# Patient Record
Sex: MALE | Race: WHITE | NOT HISPANIC OR LATINO | Employment: OTHER | URBAN - NONMETROPOLITAN AREA
[De-identification: names, ages, dates, MRNs, and addresses within clinical notes are randomized per-mention and may not be internally consistent; named-entity substitution may affect disease eponyms.]

---

## 2017-02-06 ENCOUNTER — APPOINTMENT (OUTPATIENT)
Dept: GENERAL RADIOLOGY | Facility: HOSPITAL | Age: 37
End: 2017-02-06

## 2017-02-06 ENCOUNTER — APPOINTMENT (OUTPATIENT)
Dept: CT IMAGING | Facility: HOSPITAL | Age: 37
End: 2017-02-06

## 2017-02-06 ENCOUNTER — HOSPITAL ENCOUNTER (EMERGENCY)
Facility: HOSPITAL | Age: 37
Discharge: HOME | End: 2017-02-06
Admitting: FAMILY MEDICINE

## 2017-02-06 VITALS
WEIGHT: 213 LBS | HEART RATE: 77 BPM | SYSTOLIC BLOOD PRESSURE: 134 MMHG | BODY MASS INDEX: 31.55 KG/M2 | TEMPERATURE: 98 F | HEIGHT: 69 IN | RESPIRATION RATE: 18 BRPM | OXYGEN SATURATION: 97 % | DIASTOLIC BLOOD PRESSURE: 86 MMHG

## 2017-02-06 DIAGNOSIS — S16.1XXA CERVICAL STRAIN, INITIAL ENCOUNTER: ICD-10-CM
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
## 2017-02-06 DIAGNOSIS — S70.02XA CONTUSION OF LEFT HIP, INITIAL ENCOUNTER: ICD-10-CM

## 2017-02-06 DIAGNOSIS — S09.90XA HEAD INJURY, INITIAL ENCOUNTER: ICD-10-CM

## 2017-02-06 PROCEDURE — 70450 CT HEAD/BRAIN W/O DYE: CPT

## 2017-02-06 PROCEDURE — 99284 EMERGENCY DEPT VISIT MOD MDM: CPT

## 2017-02-06 PROCEDURE — 72125 CT NECK SPINE W/O DYE: CPT

## 2017-02-06 PROCEDURE — 25010000002 ONDANSETRON PER 1 MG: Performed by: PHYSICIAN ASSISTANT

## 2017-02-06 PROCEDURE — 25010000002 HYDROMORPHONE PER 4 MG: Performed by: PHYSICIAN ASSISTANT

## 2017-02-06 PROCEDURE — 72110 X-RAY EXAM L-2 SPINE 4/>VWS: CPT

## 2017-02-06 PROCEDURE — 73502 X-RAY EXAM HIP UNI 2-3 VIEWS: CPT

## 2017-02-06 PROCEDURE — 72072 X-RAY EXAM THORAC SPINE 3VWS: CPT

## 2017-02-06 PROCEDURE — 96372 THER/PROPH/DIAG INJ SC/IM: CPT

## 2017-02-06 RX ORDER — CYCLOBENZAPRINE HCL 10 MG
10 TABLET ORAL 3 TIMES DAILY PRN
Qty: 15 TABLET | Refills: 0 | Status: SHIPPED | OUTPATIENT
Start: 2017-02-06 | End: 2018-05-18

## 2017-02-06 RX ORDER — HYDROCODONE BITARTRATE AND ACETAMINOPHEN 10; 325 MG/1; MG/1
1 TABLET ORAL ONCE
Status: DISCONTINUED | OUTPATIENT
Start: 2017-02-06 | End: 2017-02-06 | Stop reason: HOSPADM

## 2017-02-06 RX ORDER — HYDROCODONE BITARTRATE AND ACETAMINOPHEN 7.5; 325 MG/1; MG/1
1 TABLET ORAL EVERY 6 HOURS PRN
Qty: 10 TABLET | Refills: 0 | Status: SHIPPED | OUTPATIENT
Start: 2017-02-06 | End: 2017-05-05

## 2017-02-06 RX ORDER — ONDANSETRON 2 MG/ML
4 INJECTION INTRAMUSCULAR; INTRAVENOUS ONCE
Status: DISCONTINUED | OUTPATIENT
Start: 2017-02-06 | End: 2017-02-06

## 2017-02-06 RX ORDER — ONDANSETRON 2 MG/ML
4 INJECTION INTRAMUSCULAR; INTRAVENOUS ONCE
Status: COMPLETED | OUTPATIENT
Start: 2017-02-06 | End: 2017-02-06

## 2017-02-06 RX ADMIN — HYDROMORPHONE HYDROCHLORIDE 1 MG: 1 INJECTION, SOLUTION INTRAMUSCULAR; INTRAVENOUS; SUBCUTANEOUS at 16:22

## 2017-02-06 RX ADMIN — ONDANSETRON HYDROCHLORIDE 4 MG: 2 SOLUTION INTRAMUSCULAR; INTRAVENOUS at 16:22
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
## 2017-02-10 ENCOUNTER — TELEPHONE (OUTPATIENT)
Dept: NEUROLOGY | Facility: CLINIC | Age: 37
End: 2017-02-10

## 2017-04-03 RX ORDER — LEVETIRACETAM 500 MG/1
1500 TABLET, EXTENDED RELEASE ORAL 2 TIMES DAILY
Qty: 180 TABLET | Refills: 3 | Status: SHIPPED | OUTPATIENT
Start: 2017-04-03 | End: 2017-05-05 | Stop reason: SDUPTHER

## 2017-05-05 ENCOUNTER — OFFICE VISIT (OUTPATIENT)
Dept: NEUROLOGY | Facility: CLINIC | Age: 37
End: 2017-05-05

## 2017-05-05 VITALS
HEART RATE: 74 BPM | SYSTOLIC BLOOD PRESSURE: 110 MMHG | WEIGHT: 217 LBS | DIASTOLIC BLOOD PRESSURE: 82 MMHG | BODY MASS INDEX: 32.14 KG/M2 | HEIGHT: 69 IN

## 2017-05-05 DIAGNOSIS — G47.00 INSOMNIA, UNSPECIFIED TYPE: ICD-10-CM

## 2017-05-05 DIAGNOSIS — S06.9X9S TBI (TRAUMATIC BRAIN INJURY), WITH LOSS OF CONSCIOUSNESS OF UNSPECIFIED DURATION, SEQUELA: ICD-10-CM

## 2017-05-05 DIAGNOSIS — F06.30 MOOD DISORDER DUE TO OLD HEAD INJURY: ICD-10-CM

## 2017-05-05 DIAGNOSIS — S09.90XS MOOD DISORDER DUE TO OLD HEAD INJURY: ICD-10-CM

## 2017-05-05 DIAGNOSIS — G40.309 GENERALIZED SEIZURE DISORDER (HCC): Primary | ICD-10-CM

## 2017-05-05 PROCEDURE — 99213 OFFICE O/P EST LOW 20 MIN: CPT | Performed by: CLINICAL NURSE SPECIALIST

## 2017-05-05 RX ORDER — LEVETIRACETAM 500 MG/1
TABLET, EXTENDED RELEASE ORAL
Qty: 180 TABLET | Refills: 5 | Status: SHIPPED | OUTPATIENT
Start: 2017-05-05 | End: 2017-11-22 | Stop reason: SDUPTHER

## 2017-09-07 ENCOUNTER — HOSPITAL ENCOUNTER (INPATIENT)
Age: 37
LOS: 4 days | Discharge: HOME | DRG: 881 | End: 2017-09-11
Attending: EMERGENCY MEDICINE | Admitting: PSYCHIATRY & NEUROLOGY
Payer: MEDICAID

## 2017-09-07 ENCOUNTER — APPOINTMENT (OUTPATIENT)
Dept: CT IMAGING | Age: 37
DRG: 881 | End: 2017-09-07
Payer: MEDICAID

## 2017-09-07 DIAGNOSIS — R45.851 SUICIDAL IDEATION: Primary | ICD-10-CM

## 2017-09-07 LAB
ALBUMIN SERPL-MCNC: 5.1 G/DL (ref 3.5–5.2)
ALP BLD-CCNC: 61 U/L (ref 40–130)
ALT SERPL-CCNC: 48 U/L (ref 5–41)
AMPHETAMINE SCREEN, URINE: NEGATIVE
ANION GAP SERPL CALCULATED.3IONS-SCNC: 17 MMOL/L (ref 7–19)
AST SERPL-CCNC: 24 U/L (ref 5–40)
BARBITURATE SCREEN URINE: NEGATIVE
BASOPHILS ABSOLUTE: 0 K/UL (ref 0–0.2)
BASOPHILS RELATIVE PERCENT: 0.2 % (ref 0–1)
BENZODIAZEPINE SCREEN, URINE: POSITIVE
BILIRUB SERPL-MCNC: 0.9 MG/DL (ref 0.2–1.2)
BUN BLDV-MCNC: 9 MG/DL (ref 6–20)
CALCIUM SERPL-MCNC: 10 MG/DL (ref 8.6–10)
CANNABINOID SCREEN URINE: NEGATIVE
CHLORIDE BLD-SCNC: 103 MMOL/L (ref 98–111)
CO2: 23 MMOL/L (ref 22–29)
COCAINE METABOLITE SCREEN URINE: NEGATIVE
CREAT SERPL-MCNC: 0.7 MG/DL (ref 0.5–1.2)
EOSINOPHILS ABSOLUTE: 0 K/UL (ref 0–0.6)
EOSINOPHILS RELATIVE PERCENT: 0.4 % (ref 0–5)
ETHANOL: <10 MG/DL (ref 0–0.08)
GFR NON-AFRICAN AMERICAN: >60
GLUCOSE BLD-MCNC: 112 MG/DL (ref 74–109)
HCT VFR BLD CALC: 46 % (ref 42–52)
HEMOGLOBIN: 16.9 G/DL (ref 14–18)
LYMPHOCYTES ABSOLUTE: 1.4 K/UL (ref 1.1–4.5)
LYMPHOCYTES RELATIVE PERCENT: 17.5 % (ref 20–40)
Lab: ABNORMAL
MCH RBC QN AUTO: 30.1 PG (ref 27–31)
MCHC RBC AUTO-ENTMCNC: 36.7 G/DL (ref 33–37)
MCV RBC AUTO: 81.9 FL (ref 80–94)
MONOCYTES ABSOLUTE: 0.4 K/UL (ref 0–0.9)
MONOCYTES RELATIVE PERCENT: 4.4 % (ref 0–10)
NEUTROPHILS ABSOLUTE: 6.3 K/UL (ref 1.5–7.5)
NEUTROPHILS RELATIVE PERCENT: 77.1 % (ref 50–65)
OPIATE SCREEN URINE: NEGATIVE
PDW BLD-RTO: 12.5 % (ref 11.5–14.5)
PLATELET # BLD: 189 K/UL (ref 130–400)
PMV BLD AUTO: 11.6 FL (ref 9.4–12.4)
POTASSIUM SERPL-SCNC: 3.6 MMOL/L (ref 3.5–5)
RBC # BLD: 5.62 M/UL (ref 4.7–6.1)
SODIUM BLD-SCNC: 143 MMOL/L (ref 136–145)
TOTAL PROTEIN: 8.3 G/DL (ref 6.6–8.7)
WBC # BLD: 8.2 K/UL (ref 4.8–10.8)

## 2017-09-07 PROCEDURE — 70450 CT HEAD/BRAIN W/O DYE: CPT

## 2017-09-07 PROCEDURE — 99284 EMERGENCY DEPT VISIT MOD MDM: CPT | Performed by: EMERGENCY MEDICINE

## 2017-09-07 PROCEDURE — 36415 COLL VENOUS BLD VENIPUNCTURE: CPT

## 2017-09-07 PROCEDURE — 99285 EMERGENCY DEPT VISIT HI MDM: CPT

## 2017-09-07 PROCEDURE — G0480 DRUG TEST DEF 1-7 CLASSES: HCPCS

## 2017-09-07 PROCEDURE — 90791 PSYCH DIAGNOSTIC EVALUATION: CPT | Performed by: PSYCHIATRY & NEUROLOGY

## 2017-09-07 PROCEDURE — 80307 DRUG TEST PRSMV CHEM ANLYZR: CPT

## 2017-09-07 PROCEDURE — 85025 COMPLETE CBC W/AUTO DIFF WBC: CPT

## 2017-09-07 PROCEDURE — 80053 COMPREHEN METABOLIC PANEL: CPT

## 2017-09-07 PROCEDURE — 93005 ELECTROCARDIOGRAM TRACING: CPT

## 2017-09-07 PROCEDURE — 1240000000 HC EMOTIONAL WELLNESS R&B

## 2017-09-07 PROCEDURE — 6370000000 HC RX 637 (ALT 250 FOR IP): Performed by: PSYCHIATRY & NEUROLOGY

## 2017-09-07 RX ORDER — DIAZEPAM 5 MG/1
5 TABLET ORAL 2 TIMES DAILY PRN
Status: DISCONTINUED | OUTPATIENT
Start: 2017-09-07 | End: 2017-09-08

## 2017-09-07 RX ORDER — RISPERIDONE 1 MG/1
1 TABLET, FILM COATED ORAL 2 TIMES DAILY
Status: DISCONTINUED | OUTPATIENT
Start: 2017-09-07 | End: 2017-09-11 | Stop reason: HOSPADM

## 2017-09-07 RX ORDER — ACETAMINOPHEN 325 MG/1
650 TABLET ORAL EVERY 4 HOURS PRN
Status: DISCONTINUED | OUTPATIENT
Start: 2017-09-07 | End: 2017-09-11 | Stop reason: HOSPADM

## 2017-09-07 RX ORDER — LEVETIRACETAM 500 MG/1
1500 TABLET ORAL 2 TIMES DAILY
Status: DISCONTINUED | OUTPATIENT
Start: 2017-09-07 | End: 2017-09-08

## 2017-09-07 RX ORDER — DIAZEPAM 5 MG/1
5 TABLET ORAL DAILY
Status: ON HOLD | COMMUNITY
End: 2017-09-11 | Stop reason: HOSPADM

## 2017-09-07 RX ORDER — LEVETIRACETAM 500 MG/1
1500 TABLET ORAL 2 TIMES DAILY
Status: ON HOLD | COMMUNITY
End: 2017-09-08 | Stop reason: CLARIF

## 2017-09-07 RX ADMIN — DIAZEPAM 5 MG: 5 TABLET ORAL at 18:17

## 2017-09-07 RX ADMIN — RISPERIDONE 1 MG: 1 TABLET, FILM COATED ORAL at 20:52

## 2017-09-07 RX ADMIN — LEVETIRACETAM 1500 MG: 500 TABLET, FILM COATED ORAL at 20:52

## 2017-09-07 ASSESSMENT — SLEEP AND FATIGUE QUESTIONNAIRES
SLEEP PATTERN: DIFFICULTY FALLING ASLEEP;RESTLESSNESS
DIFFICULTY FALLING ASLEEP: YES
DIFFICULTY STAYING ASLEEP: YES
DIFFICULTY ARISING: YES
DO YOU USE A SLEEP AID: NO
DO YOU HAVE DIFFICULTY SLEEPING: YES
RESTFUL SLEEP: NO

## 2017-09-07 ASSESSMENT — PAIN SCALES - GENERAL: PAINLEVEL_OUTOF10: 9

## 2017-09-07 ASSESSMENT — PAIN DESCRIPTION - LOCATION: LOCATION: GENERALIZED

## 2017-09-07 ASSESSMENT — LIFESTYLE VARIABLES: HISTORY_ALCOHOL_USE: NO

## 2017-09-07 ASSESSMENT — ENCOUNTER SYMPTOMS: BACK PAIN: 1

## 2017-09-08 PROCEDURE — 1240000000 HC EMOTIONAL WELLNESS R&B

## 2017-09-08 PROCEDURE — 99231 SBSQ HOSP IP/OBS SF/LOW 25: CPT | Performed by: PSYCHIATRY & NEUROLOGY

## 2017-09-08 PROCEDURE — 6370000000 HC RX 637 (ALT 250 FOR IP): Performed by: PSYCHIATRY & NEUROLOGY

## 2017-09-08 RX ORDER — DIAZEPAM 5 MG/1
5 TABLET ORAL DAILY
Status: DISCONTINUED | OUTPATIENT
Start: 2017-09-09 | End: 2017-09-11 | Stop reason: HOSPADM

## 2017-09-08 RX ORDER — LEVETIRACETAM 500 MG/1
1500 TABLET, EXTENDED RELEASE ORAL 2 TIMES DAILY
Status: ON HOLD | COMMUNITY
End: 2017-09-11 | Stop reason: HOSPADM

## 2017-09-08 RX ORDER — LEVETIRACETAM 500 MG/1
1500 TABLET, EXTENDED RELEASE ORAL 2 TIMES DAILY
Status: DISCONTINUED | OUTPATIENT
Start: 2017-09-08 | End: 2017-09-11 | Stop reason: HOSPADM

## 2017-09-08 RX ADMIN — LEVETIRACETAM 1500 MG: 500 TABLET, FILM COATED ORAL at 08:24

## 2017-09-08 RX ADMIN — DIAZEPAM 5 MG: 5 TABLET ORAL at 08:27

## 2017-09-08 RX ADMIN — RISPERIDONE 1 MG: 1 TABLET, FILM COATED ORAL at 21:51

## 2017-09-08 RX ADMIN — LEVETIRACETAM 1500 MG: 500 TABLET, EXTENDED RELEASE ORAL at 21:51

## 2017-09-08 RX ADMIN — RISPERIDONE 1 MG: 1 TABLET, FILM COATED ORAL at 08:24

## 2017-09-09 LAB
HBA1C MFR BLD: 5 %
TSH SERPL DL<=0.05 MIU/L-ACNC: 0.86 UIU/ML (ref 0.27–4.2)
VITAMIN B-12: 736 PG/ML (ref 211–946)
VITAMIN D 25-HYDROXY: 29.5 NG/ML

## 2017-09-09 PROCEDURE — 83036 HEMOGLOBIN GLYCOSYLATED A1C: CPT

## 2017-09-09 PROCEDURE — 36415 COLL VENOUS BLD VENIPUNCTURE: CPT

## 2017-09-09 PROCEDURE — 6370000000 HC RX 637 (ALT 250 FOR IP): Performed by: PSYCHIATRY & NEUROLOGY

## 2017-09-09 PROCEDURE — 99231 SBSQ HOSP IP/OBS SF/LOW 25: CPT | Performed by: PSYCHIATRY & NEUROLOGY

## 2017-09-09 PROCEDURE — 82306 VITAMIN D 25 HYDROXY: CPT

## 2017-09-09 PROCEDURE — 84443 ASSAY THYROID STIM HORMONE: CPT

## 2017-09-09 PROCEDURE — 82607 VITAMIN B-12: CPT

## 2017-09-09 PROCEDURE — 1240000000 HC EMOTIONAL WELLNESS R&B

## 2017-09-09 PROCEDURE — 6370000000 HC RX 637 (ALT 250 FOR IP): Performed by: FAMILY MEDICINE

## 2017-09-09 RX ORDER — HYDROXYZINE HYDROCHLORIDE 25 MG/1
50 TABLET, FILM COATED ORAL 3 TIMES DAILY PRN
Status: DISCONTINUED | OUTPATIENT
Start: 2017-09-09 | End: 2017-09-11 | Stop reason: HOSPADM

## 2017-09-09 RX ORDER — ERGOCALCIFEROL (VITAMIN D2) 1250 MCG
50000 CAPSULE ORAL WEEKLY
Qty: 11 CAPSULE | Refills: 0 | Status: SHIPPED | OUTPATIENT
Start: 2017-09-09 | End: 2017-11-19

## 2017-09-09 RX ORDER — ERGOCALCIFEROL 1.25 MG/1
50000 CAPSULE ORAL WEEKLY
Status: DISCONTINUED | OUTPATIENT
Start: 2017-09-09 | End: 2017-09-11 | Stop reason: HOSPADM

## 2017-09-09 RX ORDER — DIAZEPAM 5 MG/1
5 TABLET ORAL ONCE
Status: COMPLETED | OUTPATIENT
Start: 2017-09-09 | End: 2017-09-09

## 2017-09-09 RX ADMIN — LEVETIRACETAM 1500 MG: 500 TABLET, EXTENDED RELEASE ORAL at 20:46

## 2017-09-09 RX ADMIN — ERGOCALCIFEROL 50000 UNITS: 1.25 CAPSULE ORAL at 13:10

## 2017-09-09 RX ADMIN — LEVETIRACETAM 1500 MG: 500 TABLET, EXTENDED RELEASE ORAL at 08:14

## 2017-09-09 RX ADMIN — RISPERIDONE 1 MG: 1 TABLET, FILM COATED ORAL at 08:15

## 2017-09-09 RX ADMIN — RISPERIDONE 1 MG: 1 TABLET, FILM COATED ORAL at 20:46

## 2017-09-09 RX ADMIN — HYDROXYZINE HYDROCHLORIDE 50 MG: 25 TABLET, FILM COATED ORAL at 16:24

## 2017-09-09 RX ADMIN — HYDROXYZINE HYDROCHLORIDE 50 MG: 25 TABLET, FILM COATED ORAL at 20:46

## 2017-09-09 RX ADMIN — DIAZEPAM 5 MG: 5 TABLET ORAL at 08:15

## 2017-09-09 RX ADMIN — DIAZEPAM 5 MG: 5 TABLET ORAL at 11:49

## 2017-09-10 PROCEDURE — 6370000000 HC RX 637 (ALT 250 FOR IP): Performed by: PSYCHIATRY & NEUROLOGY

## 2017-09-10 PROCEDURE — 1240000000 HC EMOTIONAL WELLNESS R&B

## 2017-09-10 RX ADMIN — DIAZEPAM 5 MG: 5 TABLET ORAL at 08:23

## 2017-09-10 RX ADMIN — HYDROXYZINE HYDROCHLORIDE 50 MG: 25 TABLET, FILM COATED ORAL at 17:24

## 2017-09-10 RX ADMIN — LEVETIRACETAM 1500 MG: 500 TABLET, EXTENDED RELEASE ORAL at 20:51

## 2017-09-10 RX ADMIN — ACETAMINOPHEN 650 MG: 325 TABLET, FILM COATED ORAL at 21:09

## 2017-09-10 RX ADMIN — LEVETIRACETAM 1500 MG: 500 TABLET, EXTENDED RELEASE ORAL at 10:09

## 2017-09-10 RX ADMIN — HYDROXYZINE HYDROCHLORIDE 50 MG: 25 TABLET, FILM COATED ORAL at 10:09

## 2017-09-10 RX ADMIN — HYDROXYZINE HYDROCHLORIDE 50 MG: 25 TABLET, FILM COATED ORAL at 20:51

## 2017-09-10 RX ADMIN — RISPERIDONE 1 MG: 1 TABLET, FILM COATED ORAL at 08:23

## 2017-09-10 RX ADMIN — RISPERIDONE 1 MG: 1 TABLET, FILM COATED ORAL at 20:51

## 2017-09-10 ASSESSMENT — PAIN SCALES - GENERAL
PAINLEVEL_OUTOF10: 4
PAINLEVEL_OUTOF10: 7

## 2017-09-10 ASSESSMENT — PAIN DESCRIPTION - LOCATION: LOCATION: BACK;HIP

## 2017-09-10 ASSESSMENT — PAIN DESCRIPTION - ORIENTATION: ORIENTATION: RIGHT;LEFT;LOWER

## 2017-09-10 ASSESSMENT — PAIN DESCRIPTION - PROGRESSION: CLINICAL_PROGRESSION: GRADUALLY IMPROVING

## 2017-09-10 ASSESSMENT — PAIN DESCRIPTION - ONSET: ONSET: ON-GOING

## 2017-09-10 ASSESSMENT — PAIN DESCRIPTION - DESCRIPTORS: DESCRIPTORS: ACHING;CONSTANT

## 2017-09-10 ASSESSMENT — PAIN DESCRIPTION - PAIN TYPE: TYPE: CHRONIC PAIN

## 2017-09-10 ASSESSMENT — PAIN DESCRIPTION - FREQUENCY: FREQUENCY: CONTINUOUS

## 2017-09-11 VITALS
DIASTOLIC BLOOD PRESSURE: 79 MMHG | TEMPERATURE: 97.9 F | SYSTOLIC BLOOD PRESSURE: 141 MMHG | WEIGHT: 210 LBS | OXYGEN SATURATION: 100 % | BODY MASS INDEX: 30.06 KG/M2 | HEART RATE: 119 BPM | HEIGHT: 70 IN | RESPIRATION RATE: 16 BRPM

## 2017-09-11 PROCEDURE — 99238 HOSP IP/OBS DSCHRG MGMT 30/<: CPT | Performed by: PSYCHIATRY & NEUROLOGY

## 2017-09-11 PROCEDURE — 6370000000 HC RX 637 (ALT 250 FOR IP): Performed by: PSYCHIATRY & NEUROLOGY

## 2017-09-11 PROCEDURE — 5130000000 HC BRIDGE APPOINTMENT

## 2017-09-11 RX ORDER — RISPERIDONE 1 MG/1
1 TABLET, FILM COATED ORAL 2 TIMES DAILY
Qty: 60 TABLET | Refills: 0 | Status: SHIPPED | OUTPATIENT
Start: 2017-09-11

## 2017-09-11 RX ORDER — LEVETIRACETAM 750 MG/1
1500 TABLET, EXTENDED RELEASE ORAL 2 TIMES DAILY
Qty: 60 TABLET | Refills: 0 | Status: SHIPPED | OUTPATIENT
Start: 2017-09-11

## 2017-09-11 RX ADMIN — RISPERIDONE 1 MG: 1 TABLET, FILM COATED ORAL at 09:11

## 2017-09-11 RX ADMIN — LEVETIRACETAM 1500 MG: 500 TABLET, EXTENDED RELEASE ORAL at 09:10

## 2017-09-11 RX ADMIN — DIAZEPAM 5 MG: 5 TABLET ORAL at 09:11

## 2017-09-11 RX ADMIN — ACETAMINOPHEN 650 MG: 325 TABLET, FILM COATED ORAL at 12:40

## 2017-09-11 ASSESSMENT — PAIN SCALES - GENERAL: PAINLEVEL_OUTOF10: 8

## 2017-09-12 LAB
EKG P AXIS: 71 DEGREES
EKG P-R INTERVAL: 158 MS
EKG Q-T INTERVAL: 342 MS
EKG QRS DURATION: 92 MS
EKG QTC CALCULATION (BAZETT): 396 MS
EKG T AXIS: 51 DEGREES

## 2017-10-04 PROBLEM — R45.851 SUICIDAL IDEATION: Status: ACTIVE | Noted: 2017-10-04

## 2017-11-22 RX ORDER — LEVETIRACETAM 500 MG/1
TABLET, EXTENDED RELEASE ORAL
Qty: 180 TABLET | Refills: 5 | Status: SHIPPED | OUTPATIENT
Start: 2017-11-22 | End: 2018-07-16 | Stop reason: SDUPTHER

## 2017-12-12 ENCOUNTER — TRANSCRIBE ORDERS (OUTPATIENT)
Dept: ADMINISTRATIVE | Facility: HOSPITAL | Age: 37
End: 2017-12-12

## 2017-12-12 ENCOUNTER — HOSPITAL ENCOUNTER (OUTPATIENT)
Dept: GENERAL RADIOLOGY | Facility: HOSPITAL | Age: 37
Discharge: HOME | End: 2017-12-12
Admitting: FAMILY MEDICINE

## 2017-12-12 DIAGNOSIS — M25.552 LEFT HIP PAIN: ICD-10-CM

## 2017-12-12 DIAGNOSIS — M54.5 LOW BACK PAIN, UNSPECIFIED BACK PAIN LATERALITY, UNSPECIFIED CHRONICITY, WITH SCIATICA PRESENCE UNSPECIFIED: Primary | ICD-10-CM

## 2017-12-12 DIAGNOSIS — M54.5 LOW BACK PAIN, UNSPECIFIED BACK PAIN LATERALITY, UNSPECIFIED CHRONICITY, WITH SCIATICA PRESENCE UNSPECIFIED: ICD-10-CM

## 2017-12-12 PROCEDURE — 72110 X-RAY EXAM L-2 SPINE 4/>VWS: CPT

## 2017-12-12 PROCEDURE — 73502 X-RAY EXAM HIP UNI 2-3 VIEWS: CPT

## 2018-07-10 ENCOUNTER — HOSPITAL ENCOUNTER (EMERGENCY)
Facility: HOSPITAL | Age: 38
Discharge: HOME | End: 2018-07-10
Admitting: EMERGENCY MEDICINE

## 2018-07-10 ENCOUNTER — APPOINTMENT (OUTPATIENT)
Dept: CT IMAGING | Facility: HOSPITAL | Age: 38
End: 2018-07-10

## 2018-07-10 VITALS
RESPIRATION RATE: 16 BRPM | TEMPERATURE: 97.8 F | HEIGHT: 70 IN | DIASTOLIC BLOOD PRESSURE: 76 MMHG | BODY MASS INDEX: 29.35 KG/M2 | HEART RATE: 55 BPM | WEIGHT: 205 LBS | SYSTOLIC BLOOD PRESSURE: 108 MMHG | OXYGEN SATURATION: 96 %

## 2018-07-10 DIAGNOSIS — R10.9 LEFT FLANK PAIN: Primary | ICD-10-CM

## 2018-07-10 LAB
ALBUMIN SERPL-MCNC: 4.5 G/DL (ref 3.5–5)
ALBUMIN/GLOB SERPL: 1.9 G/DL (ref 1.1–2.5)
ALP SERPL-CCNC: 42 U/L (ref 24–120)
ALT SERPL W P-5'-P-CCNC: 40 U/L (ref 0–54)
AMYLASE SERPL-CCNC: 59 U/L (ref 30–110)
ANION GAP SERPL CALCULATED.3IONS-SCNC: 11 MMOL/L (ref 4–13)
AST SERPL-CCNC: 28 U/L (ref 7–45)
BASOPHILS # BLD AUTO: 0.03 10*3/MM3 (ref 0–0.2)
BASOPHILS NFR BLD AUTO: 0.6 % (ref 0–2)
BILIRUB SERPL-MCNC: 0.4 MG/DL (ref 0.1–1)
BILIRUB UR QL STRIP: NEGATIVE
BUN BLD-MCNC: 8 MG/DL (ref 5–21)
BUN/CREAT SERPL: 10.7 (ref 7–25)
CALCIUM SPEC-SCNC: 9.4 MG/DL (ref 8.4–10.4)
CHLORIDE SERPL-SCNC: 100 MMOL/L (ref 98–110)
CK SERPL-CCNC: 78 U/L (ref 0–203)
CLARITY UR: CLEAR
CO2 SERPL-SCNC: 30 MMOL/L (ref 24–31)
COLOR UR: YELLOW
CREAT BLD-MCNC: 0.75 MG/DL (ref 0.5–1.4)
DEPRECATED RDW RBC AUTO: 33.2 FL (ref 40–54)
EOSINOPHIL # BLD AUTO: 0.22 10*3/MM3 (ref 0–0.7)
EOSINOPHIL NFR BLD AUTO: 4.5 % (ref 0–4)
ERYTHROCYTE [DISTWIDTH] IN BLOOD BY AUTOMATED COUNT: 11.5 % (ref 12–15)
GFR SERPL CREATININE-BSD FRML MDRD: 115 ML/MIN/1.73
GLOBULIN UR ELPH-MCNC: 2.4 GM/DL
GLUCOSE BLD-MCNC: 100 MG/DL (ref 70–100)
GLUCOSE UR STRIP-MCNC: NEGATIVE MG/DL
HCT VFR BLD AUTO: 40.3 % (ref 40–52)
HGB BLD-MCNC: 14.3 G/DL (ref 14–18)
HGB UR QL STRIP.AUTO: NEGATIVE
HOLD SPECIMEN: NORMAL
HOLD SPECIMEN: NORMAL
IMM GRANULOCYTES # BLD: 0.02 10*3/MM3 (ref 0–0.03)
IMM GRANULOCYTES NFR BLD: 0.4 % (ref 0–5)
KETONES UR QL STRIP: NEGATIVE
LEUKOCYTE ESTERASE UR QL STRIP.AUTO: NEGATIVE
LIPASE SERPL-CCNC: 84 U/L (ref 23–203)
LYMPHOCYTES # BLD AUTO: 1.36 10*3/MM3 (ref 0.72–4.86)
LYMPHOCYTES NFR BLD AUTO: 27.8 % (ref 15–45)
MCH RBC QN AUTO: 28.5 PG (ref 28–32)
MCHC RBC AUTO-ENTMCNC: 35.5 G/DL (ref 33–36)
MCV RBC AUTO: 80.3 FL (ref 82–95)
MONOCYTES # BLD AUTO: 0.27 10*3/MM3 (ref 0.19–1.3)
MONOCYTES NFR BLD AUTO: 5.5 % (ref 4–12)
MYOGLOBIN SERPL-MCNC: 40.9 NG/ML (ref 0–110)
NEUTROPHILS # BLD AUTO: 3 10*3/MM3 (ref 1.87–8.4)
NEUTROPHILS NFR BLD AUTO: 61.2 % (ref 39–78)
NITRITE UR QL STRIP: NEGATIVE
NRBC BLD MANUAL-RTO: 0 /100 WBC (ref 0–0)
PH UR STRIP.AUTO: <=5 [PH] (ref 5–8)
PLATELET # BLD AUTO: 166 10*3/MM3 (ref 130–400)
PMV BLD AUTO: 12.5 FL (ref 6–12)
POTASSIUM BLD-SCNC: 4.4 MMOL/L (ref 3.5–5.3)
PROT SERPL-MCNC: 6.9 G/DL (ref 6.3–8.7)
PROT UR QL STRIP: NEGATIVE
RBC # BLD AUTO: 5.02 10*6/MM3 (ref 4.8–5.9)
SODIUM BLD-SCNC: 141 MMOL/L (ref 135–145)
SP GR UR STRIP: 1.02 (ref 1–1.03)
URINE MYOGLOBIN, QUALITATIVE: NEGATIVE
UROBILINOGEN UR QL STRIP: NORMAL
WBC NRBC COR # BLD: 4.9 10*3/MM3 (ref 4.8–10.8)
WHOLE BLOOD HOLD SPECIMEN: NORMAL
WHOLE BLOOD HOLD SPECIMEN: NORMAL

## 2018-07-10 PROCEDURE — 85025 COMPLETE CBC W/AUTO DIFF WBC: CPT | Performed by: NURSE PRACTITIONER

## 2018-07-10 PROCEDURE — 81003 URINALYSIS AUTO W/O SCOPE: CPT | Performed by: NURSE PRACTITIONER

## 2018-07-10 PROCEDURE — 83690 ASSAY OF LIPASE: CPT | Performed by: NURSE PRACTITIONER

## 2018-07-10 PROCEDURE — 99284 EMERGENCY DEPT VISIT MOD MDM: CPT

## 2018-07-10 PROCEDURE — 82150 ASSAY OF AMYLASE: CPT | Performed by: NURSE PRACTITIONER

## 2018-07-10 PROCEDURE — 80053 COMPREHEN METABOLIC PANEL: CPT | Performed by: NURSE PRACTITIONER

## 2018-07-10 PROCEDURE — 74176 CT ABD & PELVIS W/O CONTRAST: CPT

## 2018-07-10 PROCEDURE — 96360 HYDRATION IV INFUSION INIT: CPT

## 2018-07-10 PROCEDURE — 83874 ASSAY OF MYOGLOBIN: CPT | Performed by: NURSE PRACTITIONER

## 2018-07-10 PROCEDURE — 82550 ASSAY OF CK (CPK): CPT | Performed by: NURSE PRACTITIONER

## 2018-07-10 RX ORDER — BUPRENORPHINE HYDROCHLORIDE AND NALOXONE HYDROCHLORIDE DIHYDRATE 8; 2 MG/1; MG/1
1 TABLET SUBLINGUAL 2 TIMES DAILY
COMMUNITY

## 2018-07-10 RX ADMIN — SODIUM CHLORIDE 1000 ML: 9 INJECTION, SOLUTION INTRAVENOUS at 16:35

## 2018-07-10 NOTE — ED PROVIDER NOTES
Subjective   Pt is a 38-year-old white male presents with bilateral leg cramping in his thighs for the past several days.  He is also stating he has left flank pain and nausea for the past 3-4 days.  He states he is afraid that he has a kidney stone.  He states he has been working outside for the last 2 days and has been very hot.  He has had nausea without vomiting.  He states his urine was very dark this morning.  He denies any fever or chills.  States he does feel generalized fatigue as well.        History provided by:  Patient   used: No        Review of Systems   Constitutional: Negative.    HENT: Negative.    Eyes: Negative.    Respiratory: Negative.    Cardiovascular: Negative.    Gastrointestinal: Negative.    Endocrine: Negative.    Genitourinary:        Pt is a 38-year-old white male presents with bilateral leg cramping in his thighs for the past several days.  He is also stating he has left flank pain and nausea for the past 3-4 days.  He states he is afraid that he has a kidney stone.  He states he has been working outside for the last 2 days and has been very hot.  He has had nausea without vomiting.  He states his urine was very dark this morning.  He denies any fever or chills.  States he does feel generalized fatigue as well.     Musculoskeletal: Negative.    Skin: Negative.    Allergic/Immunologic: Negative.    Neurological: Negative.    Hematological: Negative.    Psychiatric/Behavioral: Negative.    All other systems reviewed and are negative.      Past Medical History:   Diagnosis Date   • Anxiety disorder    • Seizures (CMS/[facility])    • TBI (traumatic brain injury) (CMS/[facility])    • Thoracic injury    • Thoracic vertebral fracture (CMS/[facility])        Allergies   Allergen Reactions   • Amoxicillin Rash   • Codeine Rash   • Darvon [Propoxyphene] Rash   • Dilantin [Phenytoin] Rash   • Talwin [Pentazocine] Rash   • Toradol [Ketorolac Tromethamine] Rash   • Tramadol Rash       Past  "Surgical History:   Procedure Laterality Date   • CRANIOTOMY     • CRANIOTOMY     • LUMBAR DISC SURGERY     • SHOULDER SURGERY     • THORACIC SPINE SURGERY         Family History   Problem Relation Age of Onset   • COPD Mother    • COPD Father        Social History     Social History   • Marital status:      Social History Main Topics   • Smoking status: Never Smoker   • Smokeless tobacco: Never Used   • Alcohol use No   • Drug use: No   • Sexual activity: Defer     Other Topics Concern   • Not on file       Prior to Admission medications    Medication Sig Start Date End Date Taking? Authorizing Provider   buprenorphine-naloxone (SUBOXONE) 8-2 MG per SL tablet Place 1 tablet under the tongue 2 (Two) Times a Day.   Yes Historical Provider, MD   diazepam (VALIUM) 5 MG tablet Take 5 mg by mouth Daily.   Yes Historical Provider, MD   levETIRAcetam XR (KEPPRA XR) 500 MG 24 hr tablet TAKE THREE TABLETS BY    MOUTH TWICE A DAY 11/22/17  Yes HARISH Lacy   clindamycin (CLEOCIN) 300 MG capsule Take 1 capsule by mouth 3 (Three) Times a Day. 5/18/18   Jasmaine HARISH Zamudio   HYDROcodone-acetaminophen (NORCO) 7.5-325 MG per tablet Take 1 tablet by mouth Every 6 (Six) Hours As Needed for moderate pain (4-6).    Historical Provider, MD       /76   Pulse 55   Temp 97.8 °F (36.6 °C)   Resp 16   Ht 177.8 cm (70\")   Wt 93 kg (205 lb)   SpO2 96%   BMI 29.41 kg/m²     Objective   Physical Exam   Constitutional: He is oriented to person, place, and time. He appears well-developed and well-nourished.   HENT:   Head: Normocephalic and atraumatic.   Eyes: Conjunctivae and EOM are normal. Pupils are equal, round, and reactive to light.   Neck: Normal range of motion. Neck supple.   Cardiovascular: Normal rate, regular rhythm, normal heart sounds and intact distal pulses.    Pulmonary/Chest: Effort normal and breath sounds normal.   Abdominal: Soft. Bowel sounds are normal.   Left cva tenderness on " percussion. abd soft, non distended, nontender.    Musculoskeletal: Normal range of motion.   Neurological: He is alert and oriented to person, place, and time. He has normal reflexes.   Pt forgetful- changes subject often   Skin: Skin is warm and dry.   Psychiatric: He has a normal mood and affect. His behavior is normal. Judgment and thought content normal.   Nursing note and vitals reviewed.      Procedures         Lab Results (last 24 hours)     Procedure Component Value Units Date/Time    Myoglobin Screen, Urine - Urine, Clean Catch [[id number]]  (Normal) Collected:  07/10/18 1612    Specimen:  Urine from Urine, Clean Catch Updated:  07/10/18 1631     Myoglobin, Qualitative Negative    Urinalysis With Culture If Indicated - Urine, Clean Catch [[id number]]  (Normal) Collected:  07/10/18 1612    Specimen:  Urine from Urine, Clean Catch Updated:  07/10/18 1627     Color, UA Yellow     Appearance, UA Clear     pH, UA <=5.0     Specific Gravity, UA 1.018     Glucose, UA Negative     Ketones, UA Negative     Bilirubin, UA Negative     Blood, UA Negative     Protein, UA Negative     Leuk Esterase, UA Negative     Nitrite, UA Negative     Urobilinogen, UA 0.2 E.U./dL    Narrative:       Urine microscopic not indicated.    CBC & Differential [[id number]] Collected:  07/10/18 1632    Specimen:  Blood Updated:  07/10/18 1657    Narrative:       The following orders were created for panel order CBC & Differential.  Procedure                               Abnormality         Status                     ---------                               -----------         ------                     CBC Auto Differential[[id number]]        Abnormal            Final result                 Please view results for these tests on the individual orders.    Comprehensive Metabolic Panel [[id number]] Collected:  07/10/18 1632    Specimen:  Blood Updated:  07/10/18 1703     Glucose 100 mg/dL      BUN 8 mg/dL      Creatinine 0.75 mg/dL      Sodium 141  mmol/L      Potassium 4.4 mmol/L      Chloride 100 mmol/L      CO2 30.0 mmol/L      Calcium 9.4 mg/dL      Total Protein 6.9 g/dL      Albumin 4.50 g/dL      ALT (SGPT) 40 U/L      AST (SGOT) 28 U/L      Alkaline Phosphatase 42 U/L      Total Bilirubin 0.4 mg/dL      eGFR Non African Amer 115 mL/min/1.73      Globulin 2.4 gm/dL      A/G Ratio 1.9 g/dL      BUN/Creatinine Ratio 10.7     Anion Gap 11.0 mmol/L     Lipase [[id number]]  (Normal) Collected:  07/10/18 1632    Specimen:  Blood Updated:  07/10/18 1703     Lipase 84 U/L     Amylase [[id number]]  (Normal) Collected:  07/10/18 1632    Specimen:  Blood Updated:  07/10/18 1703     Amylase 59 U/L     CK [[id number]]  (Normal) Collected:  07/10/18 1632    Specimen:  Blood Updated:  07/10/18 1703     Creatine Kinase 78 U/L     Myoglobin, Serum [[id number]]  (Normal) Collected:  07/10/18 1632    Specimen:  Blood Updated:  07/10/18 1711     Myoglobin 40.9 ng/mL     CBC Auto Differential [[id number]]  (Abnormal) Collected:  07/10/18 1632    Specimen:  Blood Updated:  07/10/18 1657     WBC 4.90 10*3/mm3      RBC 5.02 10*6/mm3      Hemoglobin 14.3 g/dL      Hematocrit 40.3 %      MCV 80.3 (L) fL      MCH 28.5 pg      MCHC 35.5 g/dL      RDW 11.5 (L) %      RDW-SD 33.2 (L) fl      MPV 12.5 (H) fL      Platelets 166 10*3/mm3      Neutrophil % 61.2 %      Lymphocyte % 27.8 %      Monocyte % 5.5 %      Eosinophil % 4.5 (H) %      Basophil % 0.6 %      Immature Grans % 0.4 %      Neutrophils, Absolute 3.00 10*3/mm3      Lymphocytes, Absolute 1.36 10*3/mm3      Monocytes, Absolute 0.27 10*3/mm3      Eosinophils, Absolute 0.22 10*3/mm3      Basophils, Absolute 0.03 10*3/mm3      Immature Grans, Absolute 0.02 10*3/mm3      nRBC 0.0 /100 WBC           CT Abdomen Pelvis Without Contrast   ED Interpretation   1. No CT evidence of acute intra-abdominal/pelvic pathological process.   This report was finalized on 07/10/2018 16:12 by Dr. Dimas Hernandez MD.      Final Result   1. No CT evidence  of acute intra-abdominal/pelvic pathological process.   This report was finalized on 07/10/2018 16:12 by Dr. Dimas Hernandez MD.          ED Course  ED Course as of Jul 10 1926   Tue Jul 10, 2018   1658 Pending rest of labs at this time   [CW]   1705 Reviewed results with pt- pt states that he is feeling better at this time. Pt will be discharged home in stable condition to follow up with pcp this week. Advised to return before if symptoms worsen   [CW]      ED Course User Index  [CW] HARISH Pandya          MDM  Number of Diagnoses or Management Options  Left flank pain: minor     Amount and/or Complexity of Data Reviewed  Clinical lab tests: ordered and reviewed  Tests in the radiology section of CPT®: reviewed and ordered  Independent visualization of images, tracings, or specimens: yes    Patient Progress  Patient progress: stable      Final diagnoses:   Left flank pain          HARISH Pandya  07/10/18 1926

## 2018-07-16 RX ORDER — LEVETIRACETAM 500 MG/1
TABLET, EXTENDED RELEASE ORAL
Qty: 180 TABLET | Refills: 0 | Status: SHIPPED | OUTPATIENT
Start: 2018-07-16 | End: 2018-07-27 | Stop reason: SDUPTHER

## 2018-07-27 ENCOUNTER — OFFICE VISIT (OUTPATIENT)
Dept: NEUROLOGY | Facility: CLINIC | Age: 38
End: 2018-07-27

## 2018-07-27 VITALS
BODY MASS INDEX: 28.63 KG/M2 | HEIGHT: 70 IN | DIASTOLIC BLOOD PRESSURE: 82 MMHG | SYSTOLIC BLOOD PRESSURE: 124 MMHG | WEIGHT: 200 LBS | HEART RATE: 70 BPM

## 2018-07-27 DIAGNOSIS — M54.2 CERVICALGIA: ICD-10-CM

## 2018-07-27 DIAGNOSIS — R20.2 NUMBNESS AND TINGLING OF RIGHT LEG: ICD-10-CM

## 2018-07-27 DIAGNOSIS — R20.0 BILATERAL ARM NUMBNESS AND TINGLING WHILE SLEEPING: ICD-10-CM

## 2018-07-27 DIAGNOSIS — G89.29 CHRONIC MIDLINE LOW BACK PAIN WITH BILATERAL SCIATICA: Primary | ICD-10-CM

## 2018-07-27 DIAGNOSIS — R20.0 NUMBNESS AND TINGLING OF RIGHT LEG: ICD-10-CM

## 2018-07-27 DIAGNOSIS — M54.42 CHRONIC MIDLINE LOW BACK PAIN WITH BILATERAL SCIATICA: Primary | ICD-10-CM

## 2018-07-27 DIAGNOSIS — M54.41 CHRONIC MIDLINE LOW BACK PAIN WITH BILATERAL SCIATICA: Primary | ICD-10-CM

## 2018-07-27 DIAGNOSIS — R20.2 BILATERAL ARM NUMBNESS AND TINGLING WHILE SLEEPING: ICD-10-CM

## 2018-07-27 DIAGNOSIS — G40.309 GENERALIZED SEIZURE DISORDER (HCC): ICD-10-CM

## 2018-07-27 DIAGNOSIS — S06.9X9S TRAUMATIC BRAIN INJURY WITH LOSS OF CONSCIOUSNESS, SEQUELA (HCC): ICD-10-CM

## 2018-07-27 PROCEDURE — 99214 OFFICE O/P EST MOD 30 MIN: CPT | Performed by: CLINICAL NURSE SPECIALIST

## 2018-07-27 RX ORDER — LEVETIRACETAM 500 MG/1
TABLET, EXTENDED RELEASE ORAL
Qty: 180 TABLET | Refills: 5 | Status: SHIPPED | OUTPATIENT
Start: 2018-07-27 | End: 2018-07-27 | Stop reason: SDUPTHER

## 2018-07-27 RX ORDER — LEVETIRACETAM 500 MG/1
TABLET, EXTENDED RELEASE ORAL
Qty: 180 TABLET | Refills: 5 | Status: SHIPPED | OUTPATIENT
Start: 2018-07-27 | End: 2019-01-30 | Stop reason: SDUPTHER
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
## 2018-08-01 ENCOUNTER — OFFICE VISIT (OUTPATIENT)
Dept: URGENT CARE | Age: 38
End: 2018-08-01
Payer: COMMERCIAL

## 2018-08-01 VITALS
TEMPERATURE: 98.2 F | SYSTOLIC BLOOD PRESSURE: 105 MMHG | DIASTOLIC BLOOD PRESSURE: 71 MMHG | HEART RATE: 67 BPM | HEIGHT: 70 IN | OXYGEN SATURATION: 97 % | WEIGHT: 203.2 LBS | BODY MASS INDEX: 29.09 KG/M2 | RESPIRATION RATE: 20 BRPM

## 2018-08-01 DIAGNOSIS — K08.89 PAIN, DENTAL: Primary | ICD-10-CM

## 2018-08-01 PROCEDURE — 99202 OFFICE O/P NEW SF 15 MIN: CPT | Performed by: PHYSICIAN ASSISTANT

## 2018-08-01 RX ORDER — DIAZEPAM 5 MG/1
5 TABLET ORAL
COMMUNITY

## 2018-08-01 RX ORDER — METHYLPREDNISOLONE 4 MG/1
TABLET ORAL
Qty: 1 KIT | Refills: 0 | Status: SHIPPED | OUTPATIENT
Start: 2018-08-01 | End: 2018-08-07

## 2018-08-01 RX ORDER — BUPRENORPHINE HYDROCHLORIDE AND NALOXONE HYDROCHLORIDE DIHYDRATE 8; 2 MG/1; MG/1
1 TABLET SUBLINGUAL
COMMUNITY

## 2018-08-01 RX ORDER — CEPHALEXIN 500 MG/1
500 CAPSULE ORAL 2 TIMES DAILY
Qty: 20 CAPSULE | Refills: 0 | Status: SHIPPED | OUTPATIENT
Start: 2018-08-01 | End: 2018-08-11

## 2018-08-01 NOTE — PROGRESS NOTES
Subjective:      Patient ID: Virginia Banuelos is a 34 y.o. male. HPI    Маринаthor Coronado presents today with dental pain. States that his toothbrush slipped and hit against the top part of his mouth 2 days ago. Since then he has had pain and swelling. No fever noted. Otherwise, feeling fine. Tried to get in with dentist today but they did not have an appt available. No medications for symptoms. Review of Systems   Constitutional: Negative for chills and fever. HENT: Positive for dental problem. All other systems reviewed and are negative. Objective:   Physical Exam   Constitutional: He is oriented to person, place, and time. He appears well-developed and well-nourished. No distress. HENT:   Head: Normocephalic and atraumatic. Right cheek swollen. No other abnormality noted   Eyes: Right eye exhibits no discharge. Left eye exhibits no discharge. Neck: Normal range of motion. Neck supple. Pulmonary/Chest: Effort normal. No respiratory distress. Neurological: He is alert and oriented to person, place, and time. Skin: Skin is warm and dry. No rash noted. He is not diaphoretic. No erythema. No pallor. Psychiatric: He has a normal mood and affect. His behavior is normal. Judgment and thought content normal.   Nursing note and vitals reviewed. Assessment:      Dental Pain      Plan:      - Start Keflex today. Take 1 tablet by mouth twice daily for 10 days.   - Start Medrol Dose John. Take as directed. - Notify clinic with any change in or worsening of symptoms.   - Return as needed.

## 2018-08-17 ENCOUNTER — APPOINTMENT (OUTPATIENT)
Dept: MRI IMAGING | Facility: HOSPITAL | Age: 38
End: 2018-08-17

## 2018-08-26 ENCOUNTER — HOSPITAL ENCOUNTER (EMERGENCY)
Facility: HOSPITAL | Age: 38
Discharge: HOME | End: 2018-08-26
Admitting: EMERGENCY MEDICINE

## 2018-08-26 VITALS
BODY MASS INDEX: 29.35 KG/M2 | RESPIRATION RATE: 20 BRPM | SYSTOLIC BLOOD PRESSURE: 128 MMHG | HEIGHT: 70 IN | HEART RATE: 60 BPM | WEIGHT: 205 LBS | DIASTOLIC BLOOD PRESSURE: 65 MMHG | OXYGEN SATURATION: 100 % | TEMPERATURE: 97.8 F

## 2018-08-26 DIAGNOSIS — K08.89 PAIN, DENTAL: Primary | ICD-10-CM

## 2018-08-26 PROCEDURE — 99283 EMERGENCY DEPT VISIT LOW MDM: CPT

## 2018-08-26 RX ORDER — METHYLPREDNISOLONE 4 MG/1
TABLET ORAL
Qty: 21 EACH | Refills: 0 | Status: SHIPPED | OUTPATIENT
Start: 2018-08-26 | End: 2018-10-16

## 2018-08-26 RX ORDER — CLINDAMYCIN HYDROCHLORIDE 300 MG/1
300 CAPSULE ORAL 4 TIMES DAILY
Qty: 40 CAPSULE | Refills: 0 | Status: SHIPPED | OUTPATIENT
Start: 2018-08-26 | End: 2018-09-05

## 2018-08-26 RX ORDER — ONDANSETRON 4 MG/1
4 TABLET, ORALLY DISINTEGRATING ORAL ONCE
Status: COMPLETED | OUTPATIENT
Start: 2018-08-26 | End: 2018-08-26

## 2018-08-26 RX ORDER — HYDROCODONE BITARTRATE AND ACETAMINOPHEN 7.5; 325 MG/1; MG/1
1 TABLET ORAL ONCE
Status: COMPLETED | OUTPATIENT
Start: 2018-08-26 | End: 2018-08-26

## 2018-08-26 RX ADMIN — HYDROCODONE BITARTRATE AND ACETAMINOPHEN 1 TABLET: 7.5; 325 TABLET ORAL at 21:08

## 2018-08-26 RX ADMIN — ONDANSETRON 4 MG: 4 TABLET, ORALLY DISINTEGRATING ORAL at 21:09

## 2018-10-09 ENCOUNTER — TELEPHONE (OUTPATIENT)
Dept: NEUROLOGY | Facility: CLINIC | Age: 38
End: 2018-10-09

## 2018-10-09 NOTE — TELEPHONE ENCOUNTER
"I called Mr. Lee to ask about previous phyiscal therapy. He did state that he had completed 6 weeks of PT in 04/2015, without any improvement, in fact, seemed to worsen.     During the call he also that I note he seems to have worsening of the numbness and tingling of his bilateral arms, to the point that he has experienced demminished  and dropping things. In addition, he said that he feels numbness/tingling from \"around his belt to toes on both legs\".  "

## 2018-12-20 ENCOUNTER — NURSE TRIAGE (OUTPATIENT)
Dept: CALL CENTER | Facility: HOSPITAL | Age: 38
End: 2018-12-20

## 2018-12-20 NOTE — TELEPHONE ENCOUNTER
"Asking about compatibility of suboxone and cold medications.    Reason for Disposition  • Caller has medication question about med not prescribed by PCP and triager unable to answer question (e.g., compatibility with other med, storage)    Additional Information  • Negative: Drug overdose and nurse unable to answer question  • Negative: Caller requesting information not related to medicine  • Negative: Caller requesting a prescription for Strep throat and has a positive culture result  • Negative: Rash while taking a medication or within 3 days of stopping it  • Negative: Immunization reaction suspected  • Negative: [1] Asthma and [2] having symptoms of asthma (cough, wheezing, etc)  • Negative: MORE THAN A DOUBLE DOSE of a prescription or over-the-counter (OTC) drug  • Negative: [1] DOUBLE DOSE (an extra dose or lesser amount) of over-the-counter (OTC) drug AND [2] any symptoms (e.g., dizziness, nausea, pain, sleepiness)  • Negative: [1] DOUBLE DOSE (an extra dose or lesser amount) of prescription drug AND [2] any symptoms (e.g., dizziness, nausea, pain, sleepiness)  • Negative: Took another person's prescription drug  • Negative: [1] DOUBLE DOSE (an extra dose or lesser amount) of prescription drug AND [2] NO symptoms (Exception: a double dose of antibiotics)  • Negative: Diabetes drug error or overdose (e.g., insulin or extra dose)  • Negative: [1] Request for URGENT new prescription or refill of \"essential\" medication (i.e., likelihood of harm to patient if not taken) AND [2] triager unable to fill per unit policy  • Negative: [1] Prescription not at pharmacy AND [2] was prescribed today by PCP  • Negative: Pharmacy calling with prescription questions and triager unable to answer question  • Negative: Caller has URGENT medication question about med that PCP prescribed and triager unable to answer question  • Negative: Caller has NON-URGENT medication question about med that PCP prescribed and triager unable to " "answer question  • Negative: Caller requesting a NON-URGENT new prescription or refill and triager unable to refill per unit policy  • Negative: [1] DOUBLE DOSE (an extra dose or lesser amount) of over-the-counter (OTC) drug AND [2] NO symptoms  • Negative: [1] DOUBLE DOSE (an extra dose or lesser amount) of antibiotic drug AND [2] NO symptoms  • Negative: Caller has medication question only, adult not sick, and triager answers question  • Negative: Caller has medication question, adult has minor symptoms, caller declines triage, and triager answers question  • Negative: Caller requesting information about medication during pregnancy; adult is not ill and triager answers question  • Negative: Caller requesting information about medication use with breastfeeding; neither adult nor infant is ill, and triager answers question  • Negative: Caller requesting a refill, no triage required, and triager able to refill per unit policy    Answer Assessment - Initial Assessment Questions  1. SYMPTOMS: \"Do you have any symptoms?\"      Cold congestion  2. SEVERITY: If symptoms are present, ask \"Are they mild, moderate or severe?\"      Asking about compatibility of mild symptoms with suboxone and cold medications.    Protocols used: MEDICATION QUESTION CALL-ADULT-      "

## 2019-01-30 RX ORDER — LEVETIRACETAM 500 MG/1
TABLET, EXTENDED RELEASE ORAL
Qty: 180 TABLET | Refills: 0 | Status: SHIPPED | OUTPATIENT
Start: 2019-01-30 | End: 2019-03-04 | Stop reason: SDUPTHER

## 2019-02-16 ENCOUNTER — NURSE TRIAGE (OUTPATIENT)
Dept: CALL CENTER | Facility: HOSPITAL | Age: 39
End: 2019-02-16

## 2019-02-16 NOTE — TELEPHONE ENCOUNTER
"[facility] If gets worse, will go to ER.     Reason for Disposition  • Can't move injured shoulder normally (e.g., full range of motion, able to touch top of head)    Additional Information  • Negative: Serious injury with multiple fractures  • Negative: [1] Major bleeding (e.g., actively dripping or spurting) AND [2] can't be stopped  • Negative: Bullet wound, stabbed by knife, or other serious penetrating wound  • Negative: Sounds like a life-threatening emergency to the triager  • Negative: Wound looks infected  • Negative: Shoulder pain from overuse (work, exercise, gardening) OR from self-induced lifting injury  • Negative: Shoulder pain not from an injury  • Negative: Looks like a broken bone (crooked or deformed)  • Negative: Looks like a dislocated joint  • Negative: Can't move injured shoulder at all  • Negative: Skin is split open or gaping  (or length > 1/2 inch or 12 mm)  • Negative: [1] Bleeding AND [2] won't stop after 10 minutes of direct pressure (using correct technique)  • Negative: [1] Dirt in the wound AND [2] not removed with 15 minutes of scrubbing  • Negative: Sounds like a serious injury to the triager  • Negative: [1] SEVERE pain AND [2] not improved 2 hours after pain medicine/ice packs  • Negative: [1] Large swelling or bruise (> 2 inches or 5 cm)    AND [2] can't move injured shoulder normally (range of motion, touch top of head)  • Negative: [1] Collarbone is painful AND [2] difficulty raising arm  • Negative: Suspicious history for the injury    Answer Assessment - Initial Assessment Questions  1. MECHANISM: \"How did the injury happen?\"      Rolled over in the bed and felt a pop and pain in the shoulder blade  2. ONSET: \"When did the injury happen?\" (Minutes or hours ago)       2 nights ago  3. APPEARANCE of INJURY: \"What does the injury look like?\"       No apparent swelling  4. SEVERITY: \"Can you move the shoulder normally?\"       No, " "cannot raise arm out to the side without pain  5. SIZE: For cuts, bruises, or swelling, ask: \"How large is it?\" (e.g., inches or centimeters;  entire joint)       N/A  6. PAIN: \"Is there pain?\" If so, ask: \"How bad is the pain?\"   (e.g., Scale 1-10; or mild, moderate, severe)      In the shoulder blade rated 4 or 5  7. TETANUS: For any breaks in the skin, ask: \"When was the last tetanus booster?\"      Yes  8. OTHER SYMPTOMS: \"Do you have any other symptoms?\" (e.g., loss of sensation)      No previous surgery on rt, had surgery on left rotator cuff but not the right  9. PREGNANCY: \"Is there any chance you are pregnant?\" \"When was your last menstrual period?\"      N/A    Protocols used: SHOULDER INJURY-ADULT-      "

## 2019-03-04 RX ORDER — LEVETIRACETAM 500 MG/1
TABLET, EXTENDED RELEASE ORAL
Qty: 180 TABLET | Refills: 0 | Status: SHIPPED | OUTPATIENT
Start: 2019-03-04 | End: 2019-04-01 | Stop reason: SDUPTHER

## 2019-04-01 RX ORDER — LEVETIRACETAM 500 MG/1
TABLET, EXTENDED RELEASE ORAL
Qty: 180 TABLET | Refills: 0 | Status: SHIPPED | OUTPATIENT
Start: 2019-04-01 | End: 2019-05-06 | Stop reason: SDUPTHER

## 2019-04-04 ENCOUNTER — HOSPITAL ENCOUNTER (OUTPATIENT)
Dept: GENERAL RADIOLOGY | Facility: HOSPITAL | Age: 39
Discharge: HOME | End: 2019-04-04
Admitting: CLINICAL NURSE SPECIALIST

## 2019-04-04 ENCOUNTER — LAB (OUTPATIENT)
Dept: LAB | Facility: HOSPITAL | Age: 39
End: 2019-04-04

## 2019-04-04 ENCOUNTER — OFFICE VISIT (OUTPATIENT)
Dept: NEUROLOGY | Facility: CLINIC | Age: 39
End: 2019-04-04

## 2019-04-04 ENCOUNTER — HOSPITAL ENCOUNTER (OUTPATIENT)
Dept: GENERAL RADIOLOGY | Facility: HOSPITAL | Age: 39
Discharge: HOME | End: 2019-04-04

## 2019-04-04 VITALS
SYSTOLIC BLOOD PRESSURE: 114 MMHG | OXYGEN SATURATION: 98 % | DIASTOLIC BLOOD PRESSURE: 70 MMHG | HEIGHT: 70 IN | BODY MASS INDEX: 30.92 KG/M2 | HEART RATE: 96 BPM | WEIGHT: 216 LBS

## 2019-04-04 DIAGNOSIS — M54.41 CHRONIC MIDLINE LOW BACK PAIN WITH BILATERAL SCIATICA: Primary | ICD-10-CM

## 2019-04-04 DIAGNOSIS — G40.309 GENERALIZED SEIZURE DISORDER (HCC): ICD-10-CM

## 2019-04-04 DIAGNOSIS — Z51.81 THERAPEUTIC DRUG MONITORING: ICD-10-CM

## 2019-04-04 DIAGNOSIS — G89.29 CHRONIC MIDLINE LOW BACK PAIN WITH BILATERAL SCIATICA: ICD-10-CM

## 2019-04-04 DIAGNOSIS — R20.2 BILATERAL ARM NUMBNESS AND TINGLING WHILE SLEEPING: ICD-10-CM

## 2019-04-04 DIAGNOSIS — M54.42 CHRONIC MIDLINE LOW BACK PAIN WITH BILATERAL SCIATICA: ICD-10-CM

## 2019-04-04 DIAGNOSIS — S06.9X9S TRAUMATIC BRAIN INJURY WITH LOSS OF CONSCIOUSNESS, SEQUELA (HCC): ICD-10-CM

## 2019-04-04 DIAGNOSIS — G47.34 NOCTURNAL HYPOXEMIA: ICD-10-CM

## 2019-04-04 DIAGNOSIS — R20.0 BILATERAL ARM NUMBNESS AND TINGLING WHILE SLEEPING: ICD-10-CM

## 2019-04-04 DIAGNOSIS — M54.42 CHRONIC MIDLINE LOW BACK PAIN WITH BILATERAL SCIATICA: Primary | ICD-10-CM

## 2019-04-04 DIAGNOSIS — M54.2 CERVICALGIA: ICD-10-CM

## 2019-04-04 DIAGNOSIS — G89.29 CHRONIC MIDLINE LOW BACK PAIN WITH BILATERAL SCIATICA: Primary | ICD-10-CM

## 2019-04-04 DIAGNOSIS — M54.41 CHRONIC MIDLINE LOW BACK PAIN WITH BILATERAL SCIATICA: ICD-10-CM

## 2019-04-04 LAB
ALBUMIN SERPL-MCNC: 4.6 G/DL (ref 3.5–5)
ALBUMIN/GLOB SERPL: 2.2 G/DL (ref 1.1–2.5)
ALP SERPL-CCNC: 58 U/L (ref 24–120)
ALT SERPL W P-5'-P-CCNC: 70 U/L (ref 0–54)
ANION GAP SERPL CALCULATED.3IONS-SCNC: 10 MMOL/L (ref 4–13)
AST SERPL-CCNC: 57 U/L (ref 7–45)
BASOPHILS # BLD AUTO: 0.04 10*3/MM3 (ref 0–0.2)
BASOPHILS NFR BLD AUTO: 1.1 % (ref 0–2)
BILIRUB SERPL-MCNC: 0.7 MG/DL (ref 0.1–1)
BUN BLD-MCNC: 10 MG/DL (ref 5–21)
BUN/CREAT SERPL: 14.5 (ref 7–25)
CALCIUM SPEC-SCNC: 9.3 MG/DL (ref 8.4–10.4)
CHLORIDE SERPL-SCNC: 100 MMOL/L (ref 98–110)
CO2 SERPL-SCNC: 29 MMOL/L (ref 24–31)
CREAT BLD-MCNC: 0.69 MG/DL (ref 0.5–1.4)
DEPRECATED RDW RBC AUTO: 37.7 FL (ref 40–54)
EOSINOPHIL # BLD AUTO: 0.2 10*3/MM3 (ref 0–0.7)
EOSINOPHIL NFR BLD AUTO: 5.3 % (ref 0–4)
ERYTHROCYTE [DISTWIDTH] IN BLOOD BY AUTOMATED COUNT: 12.8 % (ref 12–15)
FOLATE SERPL-MCNC: >20 NG/ML (ref 4.78–24.2)
GFR SERPL CREATININE-BSD FRML MDRD: 127 ML/MIN/1.73
GLOBULIN UR ELPH-MCNC: 2.1 GM/DL
GLUCOSE BLD-MCNC: 103 MG/DL (ref 70–100)
HCT VFR BLD AUTO: 37.6 % (ref 40–52)
HGB BLD-MCNC: 13.2 G/DL (ref 14–18)
IMM GRANULOCYTES # BLD AUTO: 0 10*3/MM3 (ref 0–0.05)
IMM GRANULOCYTES NFR BLD AUTO: 0 % (ref 0–5)
LYMPHOCYTES # BLD AUTO: 1.57 10*3/MM3 (ref 0.72–4.86)
LYMPHOCYTES NFR BLD AUTO: 41.5 % (ref 15–45)
MCH RBC QN AUTO: 28.6 PG (ref 28–32)
MCHC RBC AUTO-ENTMCNC: 35.1 G/DL (ref 33–36)
MCV RBC AUTO: 81.4 FL (ref 82–95)
MONOCYTES # BLD AUTO: 0.27 10*3/MM3 (ref 0.19–1.3)
MONOCYTES NFR BLD AUTO: 7.1 % (ref 4–12)
NEUTROPHILS # BLD AUTO: 1.7 10*3/MM3 (ref 1.87–8.4)
NEUTROPHILS NFR BLD AUTO: 45 % (ref 39–78)
NRBC BLD AUTO-RTO: 0 /100 WBC (ref 0–0)
PLATELET # BLD AUTO: 145 10*3/MM3 (ref 130–400)
PMV BLD AUTO: 12.9 FL (ref 6–12)
POTASSIUM BLD-SCNC: 3.6 MMOL/L (ref 3.5–5.3)
PROT SERPL-MCNC: 6.7 G/DL (ref 6.3–8.7)
RBC # BLD AUTO: 4.62 10*6/MM3 (ref 4.8–5.9)
SODIUM BLD-SCNC: 139 MMOL/L (ref 135–145)
VIT B12 BLD-MCNC: 854 PG/ML (ref 239–931)
WBC NRBC COR # BLD: 3.78 10*3/MM3 (ref 4.8–10.8)

## 2019-04-04 PROCEDURE — 82607 VITAMIN B-12: CPT | Performed by: CLINICAL NURSE SPECIALIST

## 2019-04-04 PROCEDURE — 80177 DRUG SCRN QUAN LEVETIRACETAM: CPT | Performed by: CLINICAL NURSE SPECIALIST

## 2019-04-04 PROCEDURE — 80053 COMPREHEN METABOLIC PANEL: CPT | Performed by: CLINICAL NURSE SPECIALIST

## 2019-04-04 PROCEDURE — 72100 X-RAY EXAM L-S SPINE 2/3 VWS: CPT

## 2019-04-04 PROCEDURE — 72040 X-RAY EXAM NECK SPINE 2-3 VW: CPT

## 2019-04-04 PROCEDURE — 99214 OFFICE O/P EST MOD 30 MIN: CPT | Performed by: CLINICAL NURSE SPECIALIST

## 2019-04-04 PROCEDURE — 36415 COLL VENOUS BLD VENIPUNCTURE: CPT

## 2019-04-04 PROCEDURE — 82746 ASSAY OF FOLIC ACID SERUM: CPT | Performed by: CLINICAL NURSE SPECIALIST

## 2019-04-04 PROCEDURE — 85025 COMPLETE CBC W/AUTO DIFF WBC: CPT | Performed by: CLINICAL NURSE SPECIALIST

## 2019-04-04 NOTE — PATIENT INSTRUCTIONS
Steps to Quit Smoking  Smoking tobacco can be bad for your health. It can also affect almost every organ in your body. Smoking puts you and people around you at risk for many serious long-lasting (chronic) diseases. Quitting smoking is hard, but it is one of the best things that you can do for your health. It is never too late to quit.  What are the benefits of quitting smoking?  When you quit smoking, you lower your risk for getting serious diseases and conditions. They can include:  · Lung cancer or lung disease.  · Heart disease.  · Stroke.  · Heart attack.  · Not being able to have children (infertility).  · Weak bones (osteoporosis) and broken bones (fractures).    If you have coughing, wheezing, and shortness of breath, those symptoms may get better when you quit. You may also get sick less often. If you are pregnant, quitting smoking can help to lower your chances of having a baby of low birth weight.  What can I do to help me quit smoking?  Talk with your doctor about what can help you quit smoking. Some things you can do (strategies) include:  · Quitting smoking totally, instead of slowly cutting back how much you smoke over a period of time.  · Going to in-person counseling. You are more likely to quit if you go to many counseling sessions.  · Using resources and support systems, such as:  ? Online chats with a counselor.  ? Phone quitlines.  ? Printed self-help materials.  ? Support groups or group counseling.  ? Text messaging programs.  ? Mobile phone apps or applications.  · Taking medicines. Some of these medicines may have nicotine in them. If you are pregnant or breastfeeding, do not take any medicines to quit smoking unless your doctor says it is okay. Talk with your doctor about counseling or other things that can help you.    Talk with your doctor about using more than one strategy at the same time, such as taking medicines while you are also going to in-person counseling. This can help make  quitting easier.  What things can I do to make it easier to quit?  Quitting smoking might feel very hard at first, but there is a lot that you can do to make it easier. Take these steps:  · Talk to your family and friends. Ask them to support and encourage you.  · Call phone quitlines, reach out to support groups, or work with a counselor.  · Ask people who smoke to not smoke around you.  · Avoid places that make you want (trigger) to smoke, such as:  ? Bars.  ? Parties.  ? Smoke-break areas at work.  · Spend time with people who do not smoke.  · Lower the stress in your life. Stress can make you want to smoke. Try these things to help your stress:  ? Getting regular exercise.  ? Deep-breathing exercises.  ? Yoga.  ? Meditating.  ? Doing a body scan. To do this, close your eyes, focus on one area of your body at a time from head to toe, and notice which parts of your body are tense. Try to relax the muscles in those areas.  · Download or buy apps on your mobile phone or tablet that can help you stick to your quit plan. There are many free apps, such as QuitGuide from the CDC (Centers for Disease Control and Prevention). You can find more support from smokefree.gov and other websites.    This information is not intended to replace advice given to you by your health care provider. Make sure you discuss any questions you have with your health care provider.  Document Released: 10/14/2010 Document Revised: 08/15/2017 Document Reviewed: 05/03/2016  [employer] Interactive Patient Education © 2019 [employer]  Epilepsy  Epilepsy is a condition in which a person has repeated seizures over time. A seizure is a sudden burst of abnormal electrical and chemical activity in the brain. Seizures can cause a change in attention, behavior, or the ability to remain awake and alert (altered mental status).  Epilepsy increases a person's risk of falls, accidents, and injury. It can also lead to complications,  including:  · Depression.  · Poor memory.  · Sudden unexplained death in epilepsy (SUDEP). This complication is rare, and its cause is not known.    Most people with epilepsy lead normal lives.  What are the causes?  This condition may be caused by:  · A head injury.  · An injury that happens at birth.  · A high fever during childhood.  · A stroke.  · Bleeding that goes into or around the brain.  · Certain medicines and drugs.  · Having too little oxygen for a long period of time.  · Abnormal brain development.  · Certain infections, such as meningitis and encephalitis.  · Brain tumors.  · Conditions that are passed along from parent to child (are hereditary).    What are the signs or symptoms?  Symptoms of a seizure vary greatly from person to person. They include:  · Convulsions.  · Stiffening of the body.  · Involuntary movements of the arms or legs.  · Loss of consciousness.  · Breathing problems.  · Falling suddenly.  · Confusion.  · Head nodding.  · Eye blinking or fluttering.  · Lip smacking.  · Drooling.  · Rapid eye movements.  · Grunting.  · Loss of bladder control and bowel control.  · Staring.  · Unresponsiveness.    Some people have symptoms right before a seizure happens (aura) and right after a seizure happens. Symptoms of an aura include:  · Fear or anxiety.  · Nausea.  · Feeling like the room is spinning (vertigo).  · A feeling of having seen or heard something before (sony vu).  · Odd tastes or smells.  · Changes in vision, such as seeing flashing lights or spots.    Symptoms that follow a seizure include:  · Confusion.  · Sleepiness.  · Headache.    How is this diagnosed?  This condition is diagnosed based on:  · Your symptoms.  · Your medical history.  · A physical exam.  · A neurological exam. A neurological exam is similar to a physical exam. It involves checking your strength, reflexes, coordination, and sensations.  · Tests, such as:  ? An electroencephalogram (EEG). This is a painless test  that creates a diagram of your brain waves.  ? An MRI of the brain.  ? A CT scan of the brain.  ? A lumbar puncture, also called a spinal tap.  ? Blood tests to check for signs of infection or abnormal blood chemistry.    How is this treated?  There is no cure for this condition, but treatment can help control seizures. Treatment may involve:  · Taking medicines to control seizures. These include medicines to prevent seizures and medicines to stop seizures as they occur.  · Having a device called a vagus nerve stimulator implanted in the chest. The device sends electrical impulses to the vagus nerve and to the brain to prevent seizures. This treatment may be recommended if medicines do not help.  · Brain surgery. There are several kinds of surgeries that may be done to stop seizures from happening or to reduce how often seizures happen.  · Having regular blood tests. You may need to have blood tests regularly to check that you are getting the right amount of medicine.    Once this condition has been diagnosed, it is important to begin treatment as soon as possible. For some people, epilepsy eventually goes away.  Follow these instructions at home:  Medicines    · Take over-the-counter and prescription medicines only as told by your health care provider.  · Avoid any substances that may prevent your medicine from working properly, such as alcohol.  Activity  · Get enough rest. Lack of sleep can make seizures more likely to occur.  · Follow instructions from your health care provider about driving, swimming, and doing any other activities that would be dangerous if you had a seizure.  Educating others  Teach friends and family what to do if you have a seizure. They should:  · Lay you on the ground to prevent a fall.  · Cushion your head and body.  · Loosen any tight clothing around your neck.  · Turn you on your side. If vomiting occurs, this helps keep your airway clear.  · Stay with you until you recover.  · Not  hold you down. Holding you down will not stop the seizure.  · Not put anything in your mouth.  · Know whether or not you need emergency care.    General instructions  · Avoid anything that has ever triggered a seizure for you.  · Keep a seizure diary. Record what you remember about each seizure, especially anything that might have triggered the seizure.  · Keep all follow-up visits as told by your health care provider. This is important.  Contact a health care provider if:  · Your seizure pattern changes.  · You have symptoms of infection or another illness. This might increase your risk of having a seizure.  Get help right away if:  · You have a seizure that does not stop after 5 minutes.  · You have several seizures in a row without a complete recovery in between seizures.  · You have a seizure that makes it harder to breathe.  · You have a seizure that is different from previous seizures.  · You have a seizure that leaves you unable to speak or use a part of your body.  · You did not wake up immediately after a seizure.  This information is not intended to replace advice given to you by your health care provider. Make sure you discuss any questions you have with your health care provider.  Document Released: 12/18/2006 Document Revised: 07/15/2017 Document Reviewed: 06/27/2017  [employer] Interactive Patient Education © 2019 [employer]  BMI for Adults  Body mass index (BMI) is a number that is calculated from a person's weight and height. In most adults, the number is used to find how much of an adult's weight is made up of fat. BMI is not as accurate as a direct measure of body fat.  How is BMI calculated?  BMI is calculated by dividing weight in kilograms by height in meters squared. It can also be calculated by dividing weight in pounds by height in inches squared, then multiplying the resulting number by 703. Charts are available to help you find your BMI quickly and easily without doing this  calculation.  How is BMI interpreted?  Health care professionals use BMI charts to identify whether an adult is underweight, at a normal weight, or overweight based on the following guidelines:  · Underweight: BMI less than 18.5.  · Normal weight: BMI between 18.5 and 24.9.  · Overweight: BMI between 25 and 29.9.  · Obese: BMI of 30 and above.    BMI is usually interpreted the same for males and females.  Weight includes both fat and muscle, so someone with a muscular build, such as an athlete, may have a BMI that is higher than 24.9. In cases like these, BMI may not accurately depict body fat. To determine if excess body fat is the cause of a BMI of 25 or higher, further assessments may need to be done by a health care provider.  Why is BMI a useful tool?  BMI is used to identify a possible weight problem that may be related to a medical problem or may increase the risk for medical problems. BMI can also be used to promote changes to reach a healthy weight.  This information is not intended to replace advice given to you by your health care provider. Make sure you discuss any questions you have with your health care provider.  Document Released: 08/29/2005 Document Revised: 04/27/2017 Document Reviewed: 05/15/2015  [employer] Interactive Patient Education © 2018 [employer]
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
## 2019-04-08 ENCOUNTER — TRANSCRIBE ORDERS (OUTPATIENT)
Dept: PHYSICAL THERAPY | Facility: HOSPITAL | Age: 39
End: 2019-04-08

## 2019-04-08 DIAGNOSIS — M54.16 RADICULOPATHY, LUMBAR REGION: ICD-10-CM

## 2019-04-08 DIAGNOSIS — M54.5 LOW BACK PAIN, UNSPECIFIED BACK PAIN LATERALITY, UNSPECIFIED CHRONICITY, WITH SCIATICA PRESENCE UNSPECIFIED: ICD-10-CM

## 2019-04-08 DIAGNOSIS — M54.2 CERVICALGIA: Primary | ICD-10-CM

## 2019-04-08 LAB — LEVETIRACETAM SERPL-MCNC: 40.9 UG/ML (ref 10–40)

## 2019-04-17 ENCOUNTER — HOSPITAL ENCOUNTER (EMERGENCY)
Facility: HOSPITAL | Age: 39
Discharge: HOME | End: 2019-04-17
Attending: EMERGENCY MEDICINE | Admitting: EMERGENCY MEDICINE

## 2019-04-17 ENCOUNTER — NURSE TRIAGE (OUTPATIENT)
Dept: CALL CENTER | Facility: HOSPITAL | Age: 39
End: 2019-04-17

## 2019-04-17 ENCOUNTER — APPOINTMENT (OUTPATIENT)
Dept: GENERAL RADIOLOGY | Facility: HOSPITAL | Age: 39
End: 2019-04-17

## 2019-04-17 ENCOUNTER — APPOINTMENT (OUTPATIENT)
Dept: PHYSICAL THERAPY | Facility: HOSPITAL | Age: 39
End: 2019-04-17

## 2019-04-17 VITALS
SYSTOLIC BLOOD PRESSURE: 128 MMHG | HEART RATE: 82 BPM | BODY MASS INDEX: 30.41 KG/M2 | DIASTOLIC BLOOD PRESSURE: 69 MMHG | HEIGHT: 70 IN | TEMPERATURE: 98 F | RESPIRATION RATE: 16 BRPM | WEIGHT: 212.4 LBS | OXYGEN SATURATION: 98 %

## 2019-04-17 DIAGNOSIS — J45.20 MILD INTERMITTENT REACTIVE AIRWAY DISEASE WITHOUT COMPLICATION: Primary | ICD-10-CM

## 2019-04-17 LAB
ALBUMIN SERPL-MCNC: 4.3 G/DL (ref 3.5–5)
ALBUMIN/GLOB SERPL: 1.7 G/DL (ref 1.1–2.5)
ALP SERPL-CCNC: 62 U/L (ref 24–120)
ALT SERPL W P-5'-P-CCNC: 45 U/L (ref 0–54)
ANION GAP SERPL CALCULATED.3IONS-SCNC: 10 MMOL/L (ref 4–13)
ARTERIAL PATENCY WRIST A: ABNORMAL
AST SERPL-CCNC: 48 U/L (ref 7–45)
ATMOSPHERIC PRESS: 751 MMHG
BASE EXCESS BLDA CALC-SCNC: 3.8 MMOL/L (ref 0–2)
BASOPHILS # BLD AUTO: 0.05 10*3/MM3 (ref 0–0.2)
BASOPHILS NFR BLD AUTO: 0.7 % (ref 0–2)
BDY SITE: ABNORMAL
BILIRUB SERPL-MCNC: 0.3 MG/DL (ref 0.1–1)
BODY TEMPERATURE: 37 C
BUN BLD-MCNC: 11 MG/DL (ref 5–21)
BUN/CREAT SERPL: 17.7 (ref 7–25)
CALCIUM SPEC-SCNC: 9 MG/DL (ref 8.4–10.4)
CHLORIDE SERPL-SCNC: 101 MMOL/L (ref 98–110)
CO2 SERPL-SCNC: 31 MMOL/L (ref 24–31)
CREAT BLD-MCNC: 0.62 MG/DL (ref 0.5–1.4)
D DIMER PPP FEU-MCNC: 0.26 MG/L (FEU) (ref 0–0.5)
DEPRECATED RDW RBC AUTO: 36 FL (ref 40–54)
EOSINOPHIL # BLD AUTO: 0.13 10*3/MM3 (ref 0–0.7)
EOSINOPHIL NFR BLD AUTO: 1.8 % (ref 0–4)
ERYTHROCYTE [DISTWIDTH] IN BLOOD BY AUTOMATED COUNT: 12.3 % (ref 12–15)
GFR SERPL CREATININE-BSD FRML MDRD: 144 ML/MIN/1.73
GLOBULIN UR ELPH-MCNC: 2.6 GM/DL
GLUCOSE BLD-MCNC: 103 MG/DL (ref 70–100)
HCO3 BLDA-SCNC: 29.5 MMOL/L (ref 20–26)
HCT VFR BLD AUTO: 34.6 % (ref 40–52)
HGB BLD-MCNC: 11.9 G/DL (ref 14–18)
IMM GRANULOCYTES # BLD AUTO: 0.3 10*3/MM3 (ref 0–0.05)
IMM GRANULOCYTES NFR BLD AUTO: 4.1 % (ref 0–5)
LYMPHOCYTES # BLD AUTO: 2.28 10*3/MM3 (ref 0.72–4.86)
LYMPHOCYTES NFR BLD AUTO: 31.4 % (ref 15–45)
Lab: ABNORMAL
MCH RBC QN AUTO: 27.7 PG (ref 28–32)
MCHC RBC AUTO-ENTMCNC: 34.4 G/DL (ref 33–36)
MCV RBC AUTO: 80.7 FL (ref 82–95)
MODALITY: ABNORMAL
MONOCYTES # BLD AUTO: 0.5 10*3/MM3 (ref 0.19–1.3)
MONOCYTES NFR BLD AUTO: 6.9 % (ref 4–12)
NEUTROPHILS # BLD AUTO: 4 10*3/MM3 (ref 1.87–8.4)
NEUTROPHILS NFR BLD AUTO: 55.1 % (ref 39–78)
NRBC BLD AUTO-RTO: 0 /100 WBC (ref 0–0)
PCO2 BLDA: 48.3 MM HG (ref 35–45)
PH BLDA: 7.39 PH UNITS (ref 7.35–7.45)
PLATELET # BLD AUTO: 193 10*3/MM3 (ref 130–400)
PMV BLD AUTO: 11.2 FL (ref 6–12)
PO2 BLDA: 66.7 MM HG (ref 83–108)
POTASSIUM BLD-SCNC: 3.3 MMOL/L (ref 3.5–5.3)
PROT SERPL-MCNC: 6.9 G/DL (ref 6.3–8.7)
RBC # BLD AUTO: 4.29 10*6/MM3 (ref 4.8–5.9)
SAO2 % BLDCOA: 94.2 % (ref 94–99)
SODIUM BLD-SCNC: 142 MMOL/L (ref 135–145)
VENTILATOR MODE: ABNORMAL
WBC NRBC COR # BLD: 7.26 10*3/MM3 (ref 4.8–10.8)

## 2019-04-17 PROCEDURE — 85025 COMPLETE CBC W/AUTO DIFF WBC: CPT | Performed by: EMERGENCY MEDICINE

## 2019-04-17 PROCEDURE — 96372 THER/PROPH/DIAG INJ SC/IM: CPT

## 2019-04-17 PROCEDURE — 25010000002 DEXAMETHASONE PER 1 MG: Performed by: EMERGENCY MEDICINE

## 2019-04-17 PROCEDURE — 71045 X-RAY EXAM CHEST 1 VIEW: CPT

## 2019-04-17 PROCEDURE — 80053 COMPREHEN METABOLIC PANEL: CPT | Performed by: EMERGENCY MEDICINE

## 2019-04-17 PROCEDURE — 82803 BLOOD GASES ANY COMBINATION: CPT

## 2019-04-17 PROCEDURE — 85379 FIBRIN DEGRADATION QUANT: CPT | Performed by: EMERGENCY MEDICINE

## 2019-04-17 PROCEDURE — 94640 AIRWAY INHALATION TREATMENT: CPT

## 2019-04-17 PROCEDURE — 99284 EMERGENCY DEPT VISIT MOD MDM: CPT

## 2019-04-17 PROCEDURE — 36600 WITHDRAWAL OF ARTERIAL BLOOD: CPT

## 2019-04-17 PROCEDURE — 94799 UNLISTED PULMONARY SVC/PX: CPT

## 2019-04-17 RX ORDER — ALBUTEROL SULFATE 90 UG/1
2 AEROSOL, METERED RESPIRATORY (INHALATION) EVERY 4 HOURS PRN
Qty: 1 INHALER | Refills: 0 | Status: SHIPPED | OUTPATIENT
Start: 2019-04-17 | End: 2019-11-08

## 2019-04-17 RX ORDER — DEXAMETHASONE SODIUM PHOSPHATE 10 MG/ML
10 INJECTION INTRAMUSCULAR; INTRAVENOUS ONCE
Status: COMPLETED | OUTPATIENT
Start: 2019-04-17 | End: 2019-04-17

## 2019-04-17 RX ORDER — IPRATROPIUM BROMIDE AND ALBUTEROL SULFATE 2.5; .5 MG/3ML; MG/3ML
3 SOLUTION RESPIRATORY (INHALATION) ONCE
Status: COMPLETED | OUTPATIENT
Start: 2019-04-17 | End: 2019-04-17

## 2019-04-17 RX ORDER — AZITHROMYCIN 250 MG/1
TABLET, FILM COATED ORAL
Qty: 6 TABLET | Refills: 0 | Status: SHIPPED | OUTPATIENT
Start: 2019-04-17 | End: 2019-11-08

## 2019-04-17 RX ADMIN — DEXAMETHASONE SODIUM PHOSPHATE 10 MG: 10 INJECTION INTRAMUSCULAR; INTRAVENOUS at 07:12

## 2019-04-17 RX ADMIN — IPRATROPIUM BROMIDE AND ALBUTEROL SULFATE 3 ML: 2.5; .5 SOLUTION RESPIRATORY (INHALATION) at 07:19

## 2019-04-17 NOTE — TELEPHONE ENCOUNTER
"Caller states I'm already at the ER door right now. Caller refused triage as he states \"I'm going in right now\".     Reason for Disposition  • [1] MODERATE difficulty breathing (e.g., speaks in phrases, SOB even at rest, pulse 100-120) AND [2] NEW-onset or WORSE than normal    Additional Information  • Negative: [1] Breathing stopped AND [2] hasn't returned  • Negative: Choking on something  • Negative: Severe difficulty breathing (e.g., struggling for each breath, speaks in single words)  • Negative: Bluish (or gray) lips or face now  • Negative: Difficult to awaken or acting confused (e.g., disoriented, slurred speech)  • Negative: Passed out (i.e., lost consciousness, collapsed and was not responding)  • Negative: Wheezing started suddenly after medicine, an allergic food or bee sting  • Negative: Stridor  • Negative: Slow, shallow and weak breathing  • Negative: Sounds like a life-threatening emergency to the triager  • Negative: Chest pain  • Negative: [1] Wheezing (high pitched whistling sound) AND [2] previous asthma attacks or use of asthma medicines  • Negative: [1] Difficulty breathing AND [2] only present when coughing  • Negative: [1] Difficulty breathing AND [2] only from stuffy or runny nose    Answer Assessment - Initial Assessment Questions  1. RESPIRATORY STATUS: \"Describe your breathing?\" (e.g., wheezing, shortness of breath, unable to speak, severe coughing)       Already at ER refused triage  2. ONSET: \"When did this breathing problem begin?\"       States he was seen at urgent care \"the other day\".   3. PATTERN \"Does the difficult breathing come and go, or has it been constant since it started?\"       Refused triage  4. SEVERITY: \"How bad is your breathing?\" (e.g., mild, moderate, severe)     - MILD: No SOB at rest, mild SOB with walking, speaks normally in sentences, can lay down, no retractions, pulse < 100.     - MODERATE: SOB at rest, SOB with minimal exertion and prefers to sit, cannot lie " "down flat, speaks in phrases, mild retractions, audible wheezing, pulse 100-120.     - SEVERE: Very SOB at rest, speaks in single words, struggling to breathe, sitting hunched forward, retractions, pulse > 120       Severe per caller   5. RECURRENT SYMPTOM: \"Have you had difficulty breathing before?\" If so, ask: \"When was the last time?\" and \"What happened that time?\"       Refused triage at ER door and states he was so short of breath he almost called the ambulance.   6. CARDIAC HISTORY: \"Do you have any history of heart disease?\" (e.g., heart attack, angina, bypass surgery, angioplasty)       Refused triage   7. LUNG HISTORY: \"Do you have any history of lung disease?\"  (e.g., pulmonary embolus, asthma, emphysema)      Refused triage   8. CAUSE: \"What do you think is causing the breathing problem?\"       \"I'm having difficulty\"  9. OTHER SYMPTOMS: \"Do you have any other symptoms? (e.g., dizziness, runny nose, cough, chest pain, fever)      Coughing up green stuff   10. PREGNANCY: \"Is there any chance you are pregnant?\" \"When was your last menstrual period?\"        n/a  11. TRAVEL: \"Have you traveled out of the country in the last month?\" (e.g., travel history, exposures)        ?    Protocols used: BREATHING DIFFICULTY-ADULT-AH      "

## 2019-05-06 RX ORDER — LEVETIRACETAM 500 MG/1
TABLET, EXTENDED RELEASE ORAL
Qty: 180 TABLET | Refills: 2 | OUTPATIENT
Start: 2019-05-06

## 2019-05-06 RX ORDER — LEVETIRACETAM 500 MG/1
1500 TABLET, EXTENDED RELEASE ORAL 2 TIMES DAILY
Qty: 180 TABLET | Refills: 1 | Status: SHIPPED | OUTPATIENT
Start: 2019-05-06 | End: 2019-07-19 | Stop reason: SDUPTHER

## 2019-07-19 RX ORDER — LEVETIRACETAM 500 MG/1
1500 TABLET, EXTENDED RELEASE ORAL 2 TIMES DAILY
Qty: 180 TABLET | Refills: 5 | Status: SHIPPED | OUTPATIENT
Start: 2019-07-19 | End: 2019-11-12 | Stop reason: SDUPTHER

## 2019-09-28 ENCOUNTER — HOSPITAL ENCOUNTER (EMERGENCY)
Facility: HOSPITAL | Age: 39
Discharge: HOME | End: 2019-09-28
Admitting: FAMILY MEDICINE

## 2019-09-28 ENCOUNTER — APPOINTMENT (OUTPATIENT)
Dept: GENERAL RADIOLOGY | Facility: HOSPITAL | Age: 39
End: 2019-09-28

## 2019-09-28 VITALS
WEIGHT: 210 LBS | TEMPERATURE: 99 F | HEART RATE: 62 BPM | SYSTOLIC BLOOD PRESSURE: 114 MMHG | OXYGEN SATURATION: 96 % | RESPIRATION RATE: 15 BRPM | HEIGHT: 70 IN | DIASTOLIC BLOOD PRESSURE: 71 MMHG | BODY MASS INDEX: 30.06 KG/M2
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
## 2019-09-28 DIAGNOSIS — S63.501A SPRAIN OF RIGHT WRIST, INITIAL ENCOUNTER: ICD-10-CM

## 2019-09-28 PROCEDURE — 73110 X-RAY EXAM OF WRIST: CPT

## 2019-09-28 PROCEDURE — 99283 EMERGENCY DEPT VISIT LOW MDM: CPT

## 2019-11-08 ENCOUNTER — OFFICE VISIT (OUTPATIENT)
Dept: NEUROLOGY | Facility: CLINIC | Age: 39
End: 2019-11-08

## 2019-11-08 VITALS
WEIGHT: 210 LBS | HEART RATE: 74 BPM | BODY MASS INDEX: 30.06 KG/M2 | DIASTOLIC BLOOD PRESSURE: 88 MMHG | SYSTOLIC BLOOD PRESSURE: 148 MMHG | HEIGHT: 70 IN

## 2019-11-08 DIAGNOSIS — G89.29 CHRONIC MIDLINE LOW BACK PAIN WITH BILATERAL SCIATICA: ICD-10-CM

## 2019-11-08 DIAGNOSIS — S06.9X9S TRAUMATIC BRAIN INJURY WITH LOSS OF CONSCIOUSNESS, SEQUELA (HCC): ICD-10-CM

## 2019-11-08 DIAGNOSIS — M54.42 CHRONIC MIDLINE LOW BACK PAIN WITH BILATERAL SCIATICA: ICD-10-CM

## 2019-11-08 DIAGNOSIS — G40.309 GENERALIZED SEIZURE DISORDER (HCC): Primary | ICD-10-CM

## 2019-11-08 DIAGNOSIS — M54.2 CERVICALGIA: ICD-10-CM

## 2019-11-08 DIAGNOSIS — M54.41 CHRONIC MIDLINE LOW BACK PAIN WITH BILATERAL SCIATICA: ICD-10-CM

## 2019-11-08 PROBLEM — R20.2 NUMBNESS AND TINGLING OF RIGHT LEG: Status: RESOLVED | Noted: 2018-07-27 | Resolved: 2019-11-08

## 2019-11-08 PROBLEM — R20.0 NUMBNESS AND TINGLING OF RIGHT LEG: Status: RESOLVED | Noted: 2018-07-27 | Resolved: 2019-11-08

## 2019-11-08 PROCEDURE — 99213 OFFICE O/P EST LOW 20 MIN: CPT | Performed by: CLINICAL NURSE SPECIALIST
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
## 2019-11-08 NOTE — PATIENT INSTRUCTIONS
"BMI for Adults    Body mass index (BMI) is a number that is calculated from a person's weight and height. BMI may help to estimate how much of a person's weight is composed of fat. BMI can help identify those who may be at higher risk for certain medical problems.  How is BMI used with adults?  BMI is used as a screening tool to identify possible weight problems. It is used to check whether a person is obese, overweight, healthy weight, or underweight.  How is BMI calculated?  BMI measures your weight and compares it to your height. This can be done either in English ([place] or metric measurements. Note that charts are available to help you find your BMI quickly and easily without having to do these calculations yourself.  To calculate your BMI in English ([place] measurements, your health care provider will:  1. Measure your weight in pounds (lb).  2. Multiply the number of pounds by 703.  ? For example, for a person who weighs 180 lb, multiply that number by 703, which equals 126,540.  3. Measure your height in inches (in). Then multiply that number by itself to get a measurement called \"inches squared.\"  ? For example, for a person who is 70 in tall, the \"inches squared\" measurement is 70 in x 70 in, which equals 4900 inches squared.  4. Divide the total from Step 2 (number of lb x 703) by the total from Step 3 (inches squared): 126,540 ÷ 4900 = 25.8. This is your BMI.  To calculate your BMI in metric measurements, your health care provider will:  1. Measure your weight in kilograms (kg).  2. Measure your height in meters (m). Then multiply that number by itself to get a measurement called \"meters squared.\"  ? For example, for a person who is 1.75 m tall, the \"meters squared\" measurement is 1.75 m x 1.75 m, which is equal to 3.1 meters squared.  3. Divide the number of kilograms (your weight) by the meters squared number. In this example: 70 ÷ 3.1 = 22.6. This is your BMI.  How is BMI interpreted?  To interpret your " results, your health care provider will use BMI charts to identify whether you are underweight, normal weight, overweight, or obese. The following guidelines will be used:  · Underweight: BMI less than 18.5.  · Normal weight: BMI between 18.5 and 24.9.  · Overweight: BMI between 25 and 29.9.  · Obese: BMI of 30 and above.  Please note:  · Weight includes both fat and muscle, so someone with a muscular build, such as an athlete, may have a BMI that is higher than 24.9. In cases like these, BMI is not an accurate measure of body fat.  · To determine if excess body fat is the cause of a BMI of 25 or higher, further assessments may need to be done by a health care provider.  · BMI is usually interpreted in the same way for men and women.  Why is BMI a useful tool?  BMI is useful in two ways:  · Identifying a weight problem that may be related to a medical condition, or that may increase the risk for medical problems.  · Promoting lifestyle and diet changes in order to reach a healthy weight.  Summary  · Body mass index (BMI) is a number that is calculated from a person's weight and height.  · BMI may help to estimate how much of a person's weight is composed of fat. BMI can help identify those who may be at higher risk for certain medical problems.  · BMI can be measured using English measurements or metric measurements.  · To interpret your results, your health care provider will use BMI charts to identify whether you are underweight, normal weight, overweight, or obese.  This information is not intended to replace advice given to you by your health care provider. Make sure you discuss any questions you have with your health care provider.  Document Released: 08/29/2005 Document Revised: 10/31/2018 Document Reviewed: 10/31/2018  [employer] Interactive Patient Education © 2019 [employer]  Epilepsy  Epilepsy is a condition in which a person has repeated seizures over time. A seizure is a sudden burst of abnormal  electrical and chemical activity in the brain. Seizures can cause a change in attention, behavior, or the ability to remain awake and alert (altered mental status).  Epilepsy increases a person's risk of falls, accidents, and injury. It can also lead to complications, including:  · Depression.  · Poor memory.  · Sudden unexplained death in epilepsy (SUDEP). This complication is rare, and its cause is not known.  Most people with epilepsy lead normal lives.  What are the causes?  This condition may be caused by:  · A head injury.  · An injury that happens at birth.  · A high fever during childhood.  · A stroke.  · Bleeding that goes into or around the brain.  · Certain medicines and drugs.  · Having too little oxygen for a long period of time.  · Abnormal brain development.  · Certain infections, such as meningitis and encephalitis.  · Brain tumors.  · Conditions that are passed along from parent to child (are hereditary).  What are the signs or symptoms?  Symptoms of a seizure vary greatly from person to person. They include:  · Convulsions.  · Stiffening of the body.  · Involuntary movements of the arms or legs.  · Loss of consciousness.  · Breathing problems.  · Falling suddenly.  · Confusion.  · Head nodding.  · Eye blinking or fluttering.  · Lip smacking.  · Drooling.  · Rapid eye movements.  · Grunting.  · Loss of bladder control and bowel control.  · Staring.  · Unresponsiveness.  Some people have symptoms right before a seizure happens (aura) and right after a seizure happens. Symptoms of an aura include:  · Fear or anxiety.  · Nausea.  · Feeling like the room is spinning (vertigo).  · A feeling of having seen or heard something before (sony vu).  · Odd tastes or smells.  · Changes in vision, such as seeing flashing lights or spots.  Symptoms that follow a seizure include:  · Confusion.  · Sleepiness.  · Headache.  How is this diagnosed?  This condition is diagnosed based on:  · Your symptoms.  · Your medical  history.  · A physical exam.  · A neurological exam. A neurological exam is similar to a physical exam. It involves checking your strength, reflexes, coordination, and sensations.  · Tests, such as:  ? An electroencephalogram (EEG). This is a painless test that creates a diagram of your brain waves.  ? An MRI of the brain.  ? A CT scan of the brain.  ? A lumbar puncture, also called a spinal tap.  ? Blood tests to check for signs of infection or abnormal blood chemistry.  How is this treated?  There is no cure for this condition, but treatment can help control seizures. Treatment may involve:  · Taking medicines to control seizures. These include medicines to prevent seizures and medicines to stop seizures as they occur.  · Having a device called a vagus nerve stimulator implanted in the chest. The device sends electrical impulses to the vagus nerve and to the brain to prevent seizures. This treatment may be recommended if medicines do not help.  · Brain surgery. There are several kinds of surgeries that may be done to stop seizures from happening or to reduce how often seizures happen.  · Having regular blood tests. You may need to have blood tests regularly to check that you are getting the right amount of medicine.  Once this condition has been diagnosed, it is important to begin treatment as soon as possible. For some people, epilepsy eventually goes away.  Follow these instructions at home:  Medicines    · Take over-the-counter and prescription medicines only as told by your health care provider.  · Avoid any substances that may prevent your medicine from working properly, such as alcohol.  Activity  · Get enough rest. Lack of sleep can make seizures more likely to occur.  · Follow instructions from your health care provider about driving, swimming, and doing any other activities that would be dangerous if you had a seizure.  Educating others  Teach friends and family what to do if you have a seizure. They  should:  · Lay you on the ground to prevent a fall.  · Cushion your head and body.  · Loosen any tight clothing around your neck.  · Turn you on your side. If vomiting occurs, this helps keep your airway clear.  · Stay with you until you recover.  · Not hold you down. Holding you down will not stop the seizure.  · Not put anything in your mouth.  · Know whether or not you need emergency care.  General instructions  · Avoid anything that has ever triggered a seizure for you.  · Keep a seizure diary. Record what you remember about each seizure, especially anything that might have triggered the seizure.  · Keep all follow-up visits as told by your health care provider. This is important.  Contact a health care provider if:  · Your seizure pattern changes.  · You have symptoms of infection or another illness. This might increase your risk of having a seizure.  Get help right away if:  · You have a seizure that does not stop after 5 minutes.  · You have several seizures in a row without a complete recovery in between seizures.  · You have a seizure that makes it harder to breathe.  · You have a seizure that is different from previous seizures.  · You have a seizure that leaves you unable to speak or use a part of your body.  · You did not wake up immediately after a seizure.  This information is not intended to replace advice given to you by your health care provider. Make sure you discuss any questions you have with your health care provider.  Document Released: 12/18/2006 Document Revised: 07/15/2017 Document Reviewed: 06/27/2017  [employer] Interactive Patient Education © 2019 [employer]

## 2019-11-12 NOTE — TELEPHONE ENCOUNTER
Keppra XR is on backorder at [facility].

## 2019-11-13 RX ORDER — LEVETIRACETAM 500 MG/1
1500 TABLET, EXTENDED RELEASE ORAL 2 TIMES DAILY
Qty: 180 TABLET | Refills: 1 | Status: SHIPPED | OUTPATIENT
Start: 2019-11-13 | End: 2020-01-29

## 2019-11-13 NOTE — TELEPHONE ENCOUNTER
[facility] in [place] has it in stock.  I thought I changed the pharmacy on the script yesterday.  Sometimes [facility] changes the pharmacy.

## 2019-11-25 ENCOUNTER — APPOINTMENT (OUTPATIENT)
Dept: GENERAL RADIOLOGY | Facility: HOSPITAL | Age: 39
End: 2019-11-25

## 2019-11-25 ENCOUNTER — HOSPITAL ENCOUNTER (EMERGENCY)
Facility: HOSPITAL | Age: 39
Discharge: HOME | End: 2019-11-25
Admitting: EMERGENCY MEDICINE

## 2019-11-25 VITALS
OXYGEN SATURATION: 93 % | HEART RATE: 98 BPM | RESPIRATION RATE: 17 BRPM | DIASTOLIC BLOOD PRESSURE: 72 MMHG | SYSTOLIC BLOOD PRESSURE: 117 MMHG | HEIGHT: 70 IN | WEIGHT: 208 LBS | BODY MASS INDEX: 29.78 KG/M2 | TEMPERATURE: 98.6 F

## 2019-11-25 DIAGNOSIS — R11.2 NAUSEA AND VOMITING, INTRACTABILITY OF VOMITING NOT SPECIFIED, UNSPECIFIED VOMITING TYPE: ICD-10-CM

## 2019-11-25 DIAGNOSIS — K59.00 CONSTIPATION, UNSPECIFIED CONSTIPATION TYPE: Primary | ICD-10-CM

## 2019-11-25 LAB
ALBUMIN SERPL-MCNC: 4.5 G/DL (ref 3.5–5.2)
ALBUMIN/GLOB SERPL: 2 G/DL
ALP SERPL-CCNC: 73 U/L (ref 39–117)
ALT SERPL W P-5'-P-CCNC: 40 U/L (ref 1–41)
AMPHET+METHAMPHET UR QL: NEGATIVE
AMPHETAMINES UR QL: NEGATIVE
ANION GAP SERPL CALCULATED.3IONS-SCNC: 12 MMOL/L (ref 5–15)
AST SERPL-CCNC: 24 U/L (ref 1–40)
BACTERIA UR QL AUTO: ABNORMAL /HPF
BARBITURATES UR QL SCN: NEGATIVE
BASOPHILS # BLD AUTO: 0.02 10*3/MM3 (ref 0–0.2)
BASOPHILS NFR BLD AUTO: 0.2 % (ref 0–1.5)
BENZODIAZ UR QL SCN: POSITIVE
BILIRUB SERPL-MCNC: 0.6 MG/DL (ref 0.2–1.2)
BILIRUB UR QL STRIP: NEGATIVE
BUN BLD-MCNC: 13 MG/DL (ref 6–20)
BUN/CREAT SERPL: 15.9 (ref 7–25)
BUPRENORPHINE SERPL-MCNC: POSITIVE NG/ML
CALCIUM SPEC-SCNC: 8.8 MG/DL (ref 8.6–10.5)
CANNABINOIDS SERPL QL: NEGATIVE
CHLORIDE SERPL-SCNC: 100 MMOL/L (ref 98–107)
CLARITY UR: CLEAR
CO2 SERPL-SCNC: 28 MMOL/L (ref 22–29)
COCAINE UR QL: NEGATIVE
COLOR UR: YELLOW
CREAT BLD-MCNC: 0.82 MG/DL (ref 0.76–1.27)
DEPRECATED RDW RBC AUTO: 34.7 FL (ref 37–54)
EOSINOPHIL # BLD AUTO: 0.07 10*3/MM3 (ref 0–0.4)
EOSINOPHIL NFR BLD AUTO: 0.9 % (ref 0.3–6.2)
ERYTHROCYTE [DISTWIDTH] IN BLOOD BY AUTOMATED COUNT: 12.4 % (ref 12.3–15.4)
FLUAV AG NPH QL: NEGATIVE
FLUBV AG NPH QL IA: NEGATIVE
GFR SERPL CREATININE-BSD FRML MDRD: 104 ML/MIN/1.73
GLOBULIN UR ELPH-MCNC: 2.2 GM/DL
GLUCOSE BLD-MCNC: 133 MG/DL (ref 65–99)
GLUCOSE UR STRIP-MCNC: NEGATIVE MG/DL
HCT VFR BLD AUTO: 40.9 % (ref 37.5–51)
HGB BLD-MCNC: 14.7 G/DL (ref 13–17.7)
HGB UR QL STRIP.AUTO: NEGATIVE
HYALINE CASTS UR QL AUTO: ABNORMAL /LPF
KETONES UR QL STRIP: NEGATIVE
LEUKOCYTE ESTERASE UR QL STRIP.AUTO: ABNORMAL
LIPASE SERPL-CCNC: 11 U/L (ref 13–60)
LYMPHOCYTES # BLD AUTO: 0.38 10*3/MM3 (ref 0.7–3.1)
LYMPHOCYTES NFR BLD AUTO: 4.7 % (ref 19.6–45.3)
MCH RBC QN AUTO: 28.1 PG (ref 26.6–33)
MCHC RBC AUTO-ENTMCNC: 35.9 G/DL (ref 31.5–35.7)
MCV RBC AUTO: 78.2 FL (ref 79–97)
METHADONE UR QL SCN: NEGATIVE
MONOCYTES # BLD AUTO: 0.38 10*3/MM3 (ref 0.1–0.9)
MONOCYTES NFR BLD AUTO: 4.7 % (ref 5–12)
NEUTROPHILS # BLD AUTO: 7.18 10*3/MM3 (ref 1.7–7)
NEUTROPHILS NFR BLD AUTO: 89.1 % (ref 42.7–76)
NITRITE UR QL STRIP: NEGATIVE
OPIATES UR QL: NEGATIVE
OXYCODONE UR QL SCN: NEGATIVE
PCP UR QL SCN: NEGATIVE
PH UR STRIP.AUTO: 8.5 [PH] (ref 5–8)
PLATELET # BLD AUTO: 131 10*3/MM3 (ref 140–450)
PMV BLD AUTO: 12.1 FL (ref 6–12)
POTASSIUM BLD-SCNC: 4 MMOL/L (ref 3.5–5.2)
PROPOXYPH UR QL: NEGATIVE
PROT SERPL-MCNC: 6.7 G/DL (ref 6–8.5)
PROT UR QL STRIP: NEGATIVE
RBC # BLD AUTO: 5.23 10*6/MM3 (ref 4.14–5.8)
RBC # UR: ABNORMAL /HPF
REF LAB TEST METHOD: ABNORMAL
SODIUM BLD-SCNC: 140 MMOL/L (ref 136–145)
SP GR UR STRIP: 1.02 (ref 1–1.03)
SQUAMOUS #/AREA URNS HPF: ABNORMAL /HPF
TRICYCLICS UR QL SCN: NEGATIVE
UROBILINOGEN UR QL STRIP: ABNORMAL
WBC NRBC COR # BLD: 8.06 10*3/MM3 (ref 3.4–10.8)
WBC UR QL AUTO: ABNORMAL /HPF

## 2019-11-25 PROCEDURE — 81001 URINALYSIS AUTO W/SCOPE: CPT | Performed by: NURSE PRACTITIONER

## 2019-11-25 PROCEDURE — 83690 ASSAY OF LIPASE: CPT | Performed by: NURSE PRACTITIONER

## 2019-11-25 PROCEDURE — 80307 DRUG TEST PRSMV CHEM ANLYZR: CPT | Performed by: NURSE PRACTITIONER

## 2019-11-25 PROCEDURE — 25010000002 PROMETHAZINE PER 50 MG: Performed by: NURSE PRACTITIONER

## 2019-11-25 PROCEDURE — 99284 EMERGENCY DEPT VISIT MOD MDM: CPT

## 2019-11-25 PROCEDURE — 87804 INFLUENZA ASSAY W/OPTIC: CPT | Performed by: NURSE PRACTITIONER

## 2019-11-25 PROCEDURE — 80053 COMPREHEN METABOLIC PANEL: CPT | Performed by: NURSE PRACTITIONER

## 2019-11-25 PROCEDURE — 96374 THER/PROPH/DIAG INJ IV PUSH: CPT

## 2019-11-25 PROCEDURE — 96372 THER/PROPH/DIAG INJ SC/IM: CPT

## 2019-11-25 PROCEDURE — 93005 ELECTROCARDIOGRAM TRACING: CPT | Performed by: NURSE PRACTITIONER

## 2019-11-25 PROCEDURE — 74019 RADEX ABDOMEN 2 VIEWS: CPT

## 2019-11-25 PROCEDURE — 85025 COMPLETE CBC W/AUTO DIFF WBC: CPT | Performed by: NURSE PRACTITIONER

## 2019-11-25 PROCEDURE — 93010 ELECTROCARDIOGRAM REPORT: CPT | Performed by: INTERNAL MEDICINE

## 2019-11-25 RX ORDER — PROMETHAZINE HYDROCHLORIDE 25 MG/1
25 TABLET ORAL EVERY 6 HOURS PRN
Qty: 12 TABLET | Refills: 0 | Status: SHIPPED | OUTPATIENT
Start: 2019-11-25 | End: 2022-10-10

## 2019-11-25 RX ORDER — PROMETHAZINE HYDROCHLORIDE 25 MG/ML
12.5 INJECTION, SOLUTION INTRAMUSCULAR; INTRAVENOUS ONCE
Status: DISCONTINUED | OUTPATIENT
Start: 2019-11-25 | End: 2019-11-25

## 2019-11-25 RX ORDER — POLYETHYLENE GLYCOL 3350 17 G/17G
17 POWDER, FOR SOLUTION ORAL DAILY
Qty: 12 EACH | Refills: 0 | Status: SHIPPED | OUTPATIENT
Start: 2019-11-25

## 2019-11-25 RX ORDER — PROMETHAZINE HYDROCHLORIDE 25 MG/ML
12.5 INJECTION, SOLUTION INTRAMUSCULAR; INTRAVENOUS ONCE
Status: COMPLETED | OUTPATIENT
Start: 2019-11-25 | End: 2019-11-25

## 2019-11-25 RX ADMIN — PROMETHAZINE HYDROCHLORIDE 12.5 MG: 25 INJECTION INTRAMUSCULAR; INTRAVENOUS at 14:28

## 2019-11-25 RX ADMIN — SODIUM CHLORIDE 500 ML: 9 INJECTION, SOLUTION INTRAVENOUS at 12:11

## 2019-11-25 RX ADMIN — PROMETHAZINE HYDROCHLORIDE 12.5 MG: 25 INJECTION INTRAMUSCULAR; INTRAVENOUS at 12:11

## 2019-11-25 NOTE — ED PROVIDER NOTES
Subjective     Other   Severity:  Moderate  Chronicity:  New  Context:  Reports nausea with vomiting; abdominal discomfort; constipation  Associated symptoms: abdominal pain, nausea and vomiting    Associated symptoms: no chest pain, no congestion, no cough, no diarrhea and no fever        Review of Systems   Constitutional: Negative.  Negative for fever.   HENT: Negative.  Negative for congestion.    Respiratory: Negative.  Negative for cough.    Cardiovascular: Negative.  Negative for chest pain.   Gastrointestinal: Positive for abdominal pain, nausea and vomiting. Negative for diarrhea.   Genitourinary: Negative.    Musculoskeletal: Negative.    Skin: Negative.    All other systems reviewed and are negative.      Past Medical History:   Diagnosis Date   • Anxiety disorder    • PTSD (post-traumatic stress disorder)    • Seizures (CMS/[facility])    • TBI (traumatic brain injury) (CMS/[facility])    • Thoracic injury    • Thoracic vertebral fracture (CMS/[facility])        Allergies   Allergen Reactions   • Amoxicillin Rash   • Codeine Rash   • Darvon [Propoxyphene] Rash   • Dilantin [Phenytoin] Rash   • Talwin [Pentazocine] Rash   • Toradol [Ketorolac Tromethamine] Rash   • Tramadol Rash       Past Surgical History:   Procedure Laterality Date   • CRANIOTOMY     • CRANIOTOMY     • LUMBAR DISC SURGERY     • SHOULDER SURGERY     • THORACIC SPINE SURGERY         Family History   Problem Relation Age of Onset   • COPD Mother    • COPD Father        Social History     Socioeconomic History   • Marital status:      Spouse name: Not on file   • Number of children: Not on file   • Years of education: Not on file   • Highest education level: Not on file   Tobacco Use   • Smoking status: Never Smoker   • Smokeless tobacco: Never Used   Substance and Sexual Activity   • Alcohol use: No   • Drug use: No   • Sexual activity: Defer           Objective   Physical Exam   Constitutional: He is oriented to person, place, and time. He appears  well-developed and well-nourished. No distress.   HENT:   Head: Normocephalic and atraumatic.   Nose: Nose normal.   Mouth/Throat: Oropharynx is clear and moist.   Eyes: Conjunctivae are normal. Pupils are equal, round, and reactive to light. No scleral icterus.   Neck: Normal range of motion. Neck supple. No JVD present. No thyromegaly present.   Cardiovascular: Regular rhythm, normal heart sounds and intact distal pulses.   No murmur heard.  tachycardia   Pulmonary/Chest: Effort normal and breath sounds normal. No respiratory distress. He has no wheezes. He has no rales. He exhibits no tenderness.   Abdominal: Soft. Bowel sounds are normal. He exhibits no distension and no mass. There is no tenderness. There is no rebound and no guarding.   No abdominal pain appreciated on exam; notes constipation/no BM in 4-5 days; has attempted dulcolax without relief   Musculoskeletal: Normal range of motion. He exhibits no edema.   Lymphadenopathy:     He has no cervical adenopathy.   Neurological: He is alert and oriented to person, place, and time. He has normal reflexes. No cranial nerve deficit. Coordination normal.   Skin: Skin is warm and dry. No rash noted. He is not diaphoretic. No erythema. No pallor.   Psychiatric: He has a normal mood and affect. His behavior is normal. Judgment and thought content normal.   Nursing note and vitals reviewed.      Procedures           ED Course                  MDM  Number of Diagnoses or Management Options  Constipation, unspecified constipation type: new and requires workup  Nausea and vomiting, intractability of vomiting not specified, unspecified vomiting type: new and requires workup     Amount and/or Complexity of Data Reviewed  Clinical lab tests: ordered and reviewed  Tests in the radiology section of CPT®: ordered and reviewed  Discuss the patient with other providers: yes    Risk of Complications, Morbidity, and/or Mortality  Presenting problems: moderate  Diagnostic  procedures: moderate  Management options: moderate    Patient Progress  Patient progress: improved      Final diagnoses:   Constipation, unspecified constipation type   Nausea and vomiting, intractability of vomiting not specified, unspecified vomiting type              Caitlin Packer, APRN  11/25/19 1428

## 2020-01-29 RX ORDER — LEVETIRACETAM 500 MG/1
TABLET, EXTENDED RELEASE ORAL
Qty: 180 TABLET | Refills: 0 | Status: SHIPPED | OUTPATIENT
Start: 2020-01-29 | End: 2020-03-13

## 2020-02-14 ENCOUNTER — NURSE TRIAGE (OUTPATIENT)
Dept: CALL CENTER | Facility: HOSPITAL | Age: 40
End: 2020-02-14

## 2020-02-14 NOTE — TELEPHONE ENCOUNTER
Caller states that he slipped and fell on ice last night and landed on his left side.  He is C/O of left rib pain with coughing.  He is splinting with coughs and deep breaths.  He was wondering about using a rib belt.  Discussed rationale for not using rib belts any longer.  Verbalized understanding and questions answered.    Reason for Disposition  • [1] After 72 hours AND [2] chest pain not improving    Additional Information  • Negative: Major injury from dangerous force or speed (e.g., MVA, fall > 10 feet or 3 meters)  • Negative: Bullet wound, knife wound, or other penetrating object  • Negative: Puncture wound that sounds life-threatening to the triager  • Negative: Severe difficulty breathing (e.g., struggling for each breath, speaks in single words)  • Negative: [1] Major bleeding (e.g., actively dripping or spurting) AND [2] can't be stopped  • Negative: Open wound of the chest with sound of moving air (sucking wound) or visible air bubbles  • Negative: Shock suspected (e.g., cold/pale/clammy skin, too weak to stand, low BP, rapid pulse)  • Negative: Coughing or spitting up blood  • Negative: Bluish (or gray) lips or face now  • Negative: Unconscious or was unconscious  • Negative: Sounds like a life-threatening emergency to the triager  • Negative: [1] Injuries at more than 1 site AND [2] unsure which guideline to use  • Negative: Chest pain not from an injury OR cause is unknown  • Negative: Wound looks infected  • Negative: SEVERE chest pain  • Negative: [1] Difficulty breathing AND [2] not severe  • Negative: Skin split open or gaping  • Negative: [1] Bleeding AND [2] won't stop after 10 minutes of direct pressure (using correct technique)  • Negative: Sounds like a serious injury to the triager  • Negative: [1] Can't take a deep breath BUT [2] no respiratory distress  • Negative: Shallow puncture wound  • Negative: [1] Collarbone is painful AND [2] difficulty raising arm  • Negative: Suspicious history  "for the injury  • Negative: Patient is confused or is an unreliable provider of information (e.g., dementia, profound mental retardation, alcohol intoxication)  • Negative: [1] High-risk adult (e.g., age > 60, osteoporosis, chronic steroid use) AND [2] still hurts  • Negative: [1] No prior tetanus shots (or is not fully vaccinated) AND [2] any wound (e.g., cut, scrape)  • Negative: [1] Last tetanus shot > 5 years ago AND [2] DIRTY cut or scrape  • Negative: [1] Last tetanus shot > 10 years ago AND [2] CLEAN cut or scrape (e.g., object AND skin were clean)    Answer Assessment - Initial Assessment Questions  1. MECHANISM: \"How did the injury happen?\"      Fell on ice last night  2. ONSET: \"When did the injury happen?\" (Minutes or hours ago)      10 hours ago  3. LOCATION: \"Where on the chest is the injury located?\"      Left ribs  4. APPEARANCE: \"What does the injury look like?\"      A little swelling   5. BLEEDING: \"Is there any bleeding now? If so, ask: How long has it been bleeding?\"      no  6. SEVERITY: \"Any difficulty with breathing?\"      no  7. SIZE: For cuts, bruises, or swelling, ask: \"How large is it?\" (e.g., inches or centimeters)      small  8. PAIN: \"Is there pain?\" If so, ask: \"How bad is the pain?\"   (e.g., Scale 1-10; or mild, moderate, severe)      Moderate with cough  9. TETANUS: For any breaks in the skin, ask: \"When was the last tetanus booster?\"      na  10. PREGNANCY: \"Is there any chance you are pregnant?\" \"When was your last menstrual period?\"        na    Protocols used: CHEST INJURY-ADULT-      "

## 2020-03-13 RX ORDER — LEVETIRACETAM 500 MG/1
TABLET, EXTENDED RELEASE ORAL
Qty: 180 TABLET | Refills: 0 | Status: SHIPPED | OUTPATIENT
Start: 2020-03-13 | End: 2020-04-22

## 2020-03-21 ENCOUNTER — ANESTHESIA (OUTPATIENT)
Dept: PERIOP | Facility: HOSPITAL | Age: 40
End: 2020-03-21

## 2020-03-21 ENCOUNTER — ANESTHESIA EVENT (OUTPATIENT)
Dept: PERIOP | Facility: HOSPITAL | Age: 40
End: 2020-03-21

## 2020-03-21 ENCOUNTER — HOSPITAL ENCOUNTER (EMERGENCY)
Facility: HOSPITAL | Age: 40
Discharge: HOME | End: 2020-03-22
Attending: EMERGENCY MEDICINE

## 2020-03-21 DIAGNOSIS — T18.128A ESOPHAGEAL OBSTRUCTION DUE TO FOOD IMPACTION: Primary | ICD-10-CM

## 2020-03-21 DIAGNOSIS — K22.2 ESOPHAGEAL OBSTRUCTION DUE TO FOOD IMPACTION: Primary | ICD-10-CM

## 2020-03-21 PROCEDURE — 99283 EMERGENCY DEPT VISIT LOW MDM: CPT

## 2020-03-21 PROCEDURE — 25010000002 ONDANSETRON PER 1 MG: Performed by: EMERGENCY MEDICINE

## 2020-03-21 PROCEDURE — 25010000002 LORAZEPAM PER 2 MG: Performed by: EMERGENCY MEDICINE

## 2020-03-21 PROCEDURE — 96375 TX/PRO/DX INJ NEW DRUG ADDON: CPT

## 2020-03-21 PROCEDURE — 43235 EGD DIAGNOSTIC BRUSH WASH: CPT | Performed by: INTERNAL MEDICINE

## 2020-03-21 PROCEDURE — 99284 EMERGENCY DEPT VISIT MOD MDM: CPT | Performed by: INTERNAL MEDICINE

## 2020-03-21 PROCEDURE — 25010000002 GLUCAGON (HUMAN RECOMBINANT) 1 MG RECONSTITUTED SOLUTION: Performed by: EMERGENCY MEDICINE

## 2020-03-21 PROCEDURE — 25010000002 PROPOFOL 10 MG/ML EMULSION: Performed by: NURSE ANESTHETIST, CERTIFIED REGISTERED

## 2020-03-21 PROCEDURE — 96374 THER/PROPH/DIAG INJ IV PUSH: CPT

## 2020-03-21 RX ORDER — SODIUM CHLORIDE, SODIUM LACTATE, POTASSIUM CHLORIDE, CALCIUM CHLORIDE 600; 310; 30; 20 MG/100ML; MG/100ML; MG/100ML; MG/100ML
INJECTION, SOLUTION INTRAVENOUS CONTINUOUS PRN
Status: DISCONTINUED | OUTPATIENT
Start: 2020-03-21 | End: 2020-03-21 | Stop reason: SURG

## 2020-03-21 RX ORDER — ONDANSETRON 2 MG/ML
4 INJECTION INTRAMUSCULAR; INTRAVENOUS ONCE AS NEEDED
Status: DISCONTINUED | OUTPATIENT
Start: 2020-03-21 | End: 2020-03-22 | Stop reason: HOSPADM

## 2020-03-21 RX ORDER — SODIUM CHLORIDE 0.9 % (FLUSH) 0.9 %
10 SYRINGE (ML) INJECTION AS NEEDED
Status: CANCELLED | OUTPATIENT
Start: 2020-03-21

## 2020-03-21 RX ORDER — SODIUM CHLORIDE 0.9 % (FLUSH) 0.9 %
10 SYRINGE (ML) INJECTION EVERY 12 HOURS SCHEDULED
Status: CANCELLED | OUTPATIENT
Start: 2020-03-21

## 2020-03-21 RX ORDER — PROPOFOL 10 MG/ML
VIAL (ML) INTRAVENOUS AS NEEDED
Status: DISCONTINUED | OUTPATIENT
Start: 2020-03-21 | End: 2020-03-21 | Stop reason: SURG

## 2020-03-21 RX ORDER — FAMOTIDINE 10 MG/ML
20 INJECTION, SOLUTION INTRAVENOUS ONCE
Status: COMPLETED | OUTPATIENT
Start: 2020-03-21 | End: 2020-03-21

## 2020-03-21 RX ORDER — LORAZEPAM 2 MG/ML
1 INJECTION INTRAMUSCULAR ONCE
Status: COMPLETED | OUTPATIENT
Start: 2020-03-21 | End: 2020-03-21

## 2020-03-21 RX ORDER — LIDOCAINE HYDROCHLORIDE 20 MG/ML
INJECTION, SOLUTION INFILTRATION; PERINEURAL AS NEEDED
Status: DISCONTINUED | OUTPATIENT
Start: 2020-03-21 | End: 2020-03-21 | Stop reason: SURG

## 2020-03-21 RX ORDER — SODIUM CHLORIDE 9 MG/ML
100 INJECTION, SOLUTION INTRAVENOUS CONTINUOUS
Status: CANCELLED | OUTPATIENT
Start: 2020-03-21

## 2020-03-21 RX ORDER — ONDANSETRON 2 MG/ML
4 INJECTION INTRAMUSCULAR; INTRAVENOUS ONCE
Status: COMPLETED | OUTPATIENT
Start: 2020-03-21 | End: 2020-03-21

## 2020-03-21 RX ADMIN — LORAZEPAM 1 MG: 2 INJECTION, SOLUTION INTRAMUSCULAR; INTRAVENOUS at 22:53

## 2020-03-21 RX ADMIN — PROPOFOL 400 MG: 10 INJECTION, EMULSION INTRAVENOUS at 23:33

## 2020-03-21 RX ADMIN — LIDOCAINE HYDROCHLORIDE 200 MG: 20 INJECTION, SOLUTION INFILTRATION; PERINEURAL at 23:33

## 2020-03-21 RX ADMIN — GLUCAGON HYDROCHLORIDE 1 MG: KIT at 21:48

## 2020-03-21 RX ADMIN — FAMOTIDINE 20 MG: 10 INJECTION, SOLUTION INTRAVENOUS at 21:52

## 2020-03-21 RX ADMIN — SODIUM CHLORIDE, POTASSIUM CHLORIDE, SODIUM LACTATE AND CALCIUM CHLORIDE: 600; 310; 30; 20 INJECTION, SOLUTION INTRAVENOUS at 23:30

## 2020-03-21 RX ADMIN — ONDANSETRON HYDROCHLORIDE 4 MG: 2 SOLUTION INTRAMUSCULAR; INTRAVENOUS at 21:50

## 2020-03-22 VITALS
BODY MASS INDEX: 30.06 KG/M2 | HEART RATE: 72 BPM | RESPIRATION RATE: 16 BRPM | WEIGHT: 210 LBS | TEMPERATURE: 97.9 F | OXYGEN SATURATION: 95 % | SYSTOLIC BLOOD PRESSURE: 129 MMHG | HEIGHT: 70 IN | DIASTOLIC BLOOD PRESSURE: 87 MMHG

## 2020-03-22 NOTE — ANESTHESIA POSTPROCEDURE EVALUATION
"Patient: Shaheen Lee    Procedure Summary     Date:  03/21/20 Room / Location:   [facility] OR 02 /  [facility]    Anesthesia Start:  2330 Anesthesia Stop:  2348    Procedure:  ESOPHAGOGASTRODUODENOSCOPY WITH ANESTHESIA (N/A ) Diagnosis:  Foreign body in esophagus    Surgeon:  Santino Patton MD Provider:  Hamzah Canales CRNA    Anesthesia Type:  general ASA Status:  3 - Emergent          Anesthesia Type: general    Vitals  Vitals Value Taken Time   BP 86/52 3/21/2020 11:46 PM   Temp 97.8 °F (36.6 °C) 3/21/2020 11:46 PM   Pulse 99 3/21/2020 11:48 PM   Resp 18 3/21/2020 11:46 PM   SpO2 97 % 3/21/2020 11:48 PM   Vitals shown include unvalidated device data.        Post Anesthesia Care and Evaluation    Patient location during evaluation: PACU  Patient participation: complete - patient participated  Level of consciousness: awake and alert  Pain management: adequate  Airway patency: patent  Anesthetic complications: No anesthetic complications    Cardiovascular status: acceptable  Respiratory status: acceptable  Hydration status: acceptable    Comments: Blood pressure (!) 86/52, pulse 91, temperature 97.8 °F (36.6 °C), temperature source Temporal, resp. rate 18, height 177.8 cm (70\"), weight 95.3 kg (210 lb), SpO2 98 %.    Pt discharged from PACU based on sam score >8      "

## 2020-03-22 NOTE — ANESTHESIA PREPROCEDURE EVALUATION
Anesthesia Evaluation     Patient summary reviewed and Nursing notes reviewed   NPO Solid Status: Waived due to emergency  NPO Liquid Status: Waived due to emergency           Airway   Mallampati: I  TM distance: >3 FB  Neck ROM: full  No difficulty expected  Dental - normal exam     Pulmonary - negative pulmonary ROS and normal exam   Cardiovascular - negative cardio ROS and normal exam        Neuro/Psych  (+) seizures well controlled, numbness, psychiatric history Anxiety, Depression and PTSD,     GI/Hepatic/Renal/Endo - negative ROS     Musculoskeletal     (+) neck pain,   Abdominal  - normal exam   Substance History - negative use     OB/GYN negative ob/gyn ROS         Other - negative ROS                       Anesthesia Plan    ASA 3 - emergent     general   Rapid sequence  intravenous induction     Anesthetic plan, all risks, benefits, and alternatives have been provided, discussed and informed consent has been obtained with: patient and spouse/significant other.

## 2020-03-22 NOTE — ANESTHESIA PROCEDURE NOTES
Airway  Urgency: elective    Date/Time: 3/21/2020 11:33 PM  Airway not difficult    General Information and Staff    Patient location during procedure: OR  CRNA: Hamzah Canales CRNA    Indications and Patient Condition  Indications for airway management: airway protection    Preoxygenated: yes  MILS maintained throughout  Mask difficulty assessment: 1 - vent by mask    Final Airway Details  Final airway type: endotracheal airway      Successful airway: ETT  Cuffed: yes   Successful intubation technique: direct laryngoscopy  Endotracheal tube insertion site: oral  Blade: Villalba  Blade size: 2  ETT size (mm): 7.5  Cormack-Lehane Classification: grade I - full view of glottis  Placement verified by: chest auscultation and capnometry   Cuff volume (mL): 5  Measured from: lips  ETT/EBT  to lips (cm): 20  Number of attempts at approach: 1  Assessment: lips, teeth, and gum same as pre-op and atraumatic intubation

## 2020-04-22 RX ORDER — LEVETIRACETAM 500 MG/1
TABLET, EXTENDED RELEASE ORAL
Qty: 180 TABLET | Refills: 0 | Status: SHIPPED | OUTPATIENT
Start: 2020-04-22 | End: 2020-06-29

## 2020-05-20 ENCOUNTER — OFFICE VISIT (OUTPATIENT)
Dept: NEUROLOGY | Facility: CLINIC | Age: 40
End: 2020-05-20

## 2020-05-20 VITALS
HEIGHT: 70 IN | BODY MASS INDEX: 31.64 KG/M2 | HEART RATE: 89 BPM | OXYGEN SATURATION: 98 % | WEIGHT: 221 LBS | DIASTOLIC BLOOD PRESSURE: 80 MMHG | SYSTOLIC BLOOD PRESSURE: 132 MMHG

## 2020-05-20 DIAGNOSIS — G40.309 GENERALIZED SEIZURE DISORDER (HCC): Primary | ICD-10-CM

## 2020-05-20 DIAGNOSIS — M54.2 CERVICALGIA: ICD-10-CM

## 2020-05-20 PROCEDURE — 99213 OFFICE O/P EST LOW 20 MIN: CPT | Performed by: PHYSICIAN ASSISTANT

## 2020-05-20 RX ORDER — ALBUTEROL SULFATE 90 UG/1
AEROSOL, METERED RESPIRATORY (INHALATION) EVERY 6 HOURS PRN
COMMUNITY
Start: 2020-05-11

## 2020-05-20 RX ORDER — TRIAMCINOLONE ACETONIDE 1 MG/G
CREAM TOPICAL DAILY PRN
COMMUNITY
Start: 2020-04-17

## 2020-05-20 RX ORDER — FLUTICASONE PROPIONATE 50 MCG
SPRAY, SUSPENSION (ML) NASAL DAILY PRN
COMMUNITY
Start: 2020-05-14 | End: 2022-10-10

## 2020-05-20 NOTE — PROGRESS NOTES
"  Neurology Progress Note      Chief Complaint:    Seizure disorder  History of TBI  Chronic neck pain    Subjective     Subjective:  This is a 39-year-old right-hand-dominant male routinely cared for by Dr. Shaheen Sawyer MD, who has a history of remote TBI, bifrontal craniotomy and resultant seizure disorder.  Patient has not had any seizures since last being seen on November 2019.  He takes Keppra 15 mg twice daily.  He is compliant with this medication.    His main complaint today is that he has chronic posterior cervical pain.  He did describes decreased range of motion.  He states that he has done therapy for this in the past, but refers to it as \"physical therapy I did myself.\"  He also does martial arts and equates this also to physical therapy.  He has no radicular symptoms.  He is very preoccupied by the fact that his neck \"pops.\"  He states, \"it scares him to have to death.\"      Past Medical History:   Diagnosis Date   • Anxiety disorder    • PTSD (post-traumatic stress disorder)    • Seizures (CMS/[facility])    • TBI (traumatic brain injury) (CMS/[facility])    • Thoracic injury    • Thoracic vertebral fracture (CMS/[facility])      Past Surgical History:   Procedure Laterality Date   • CRANIOTOMY     • CRANIOTOMY     • ENDOSCOPY N/A 3/21/2020    Procedure: ESOPHAGOGASTRODUODENOSCOPY WITH ANESTHESIA;  Surgeon: Santino Patton MD;  Location: [facility];  Service: Gastroenterology;  Laterality: N/A;  preop; foreign body in esophagus  postop; food bolus removal   PCP none    • LUMBAR DISC SURGERY     • SHOULDER SURGERY     • THORACIC SPINE SURGERY       Family History   Problem Relation Age of Onset   • COPD Mother    • COPD Father      Social History     Tobacco Use   • Smoking status: Never Smoker   • Smokeless tobacco: Never Used   Substance Use Topics   • Alcohol use: No   • Drug use: No       Medications:  Current Outpatient Medications   Medication Sig Dispense Refill   • buprenorphine-naloxone (SUBOXONE) 8-2 MG per SL " tablet Place 1 tablet under the tongue 2 (Two) Times a Day.     • diazepam (VALIUM) 5 MG tablet Take 5 mg by mouth 2 (Two) Times a Day.     • fluticasone (FLONASE) 50 MCG/ACT nasal spray      • levETIRAcetam XR (KEPPRA XR) 500 MG 24 hr tablet TAKE 3 TABLETS BY MOUTH 2 (TWO) TIMES A  tablet 0   • methocarbamol (ROBAXIN) 750 MG tablet TAKE ONE TABLET BY MOUTH FOUR TIMES DAILY AS NEEDED FOR MUSCLE SPAMS  0   • polyethylene glycol (MIRALAX) packet Take 17 g by mouth Daily. 12 each 0   • promethazine (PHENERGAN) 25 MG tablet Take 1 tablet by mouth Every 6 (Six) Hours As Needed for Nausea or Vomiting. 12 tablet 0   • triamcinolone (KENALOG) 0.1 % cream      • VENTOLIN  (90 Base) MCG/ACT inhaler        No current facility-administered medications for this visit.        Allergies:    Amoxicillin; Codeine; Darvon [propoxyphene]; Dilantin [phenytoin]; Talwin [pentazocine]; Toradol [ketorolac tromethamine]; and Tramadol    Review of Systems:   -A 14 point review of systems is completed and is negative.      Objective      Vital Signs  Heart Rate:  [89] 89  BP: (132)/(80) 132/80    Physical Exam:    General Exam:  Head:  Normocephalic, atraumatic.  HEENT: PERRLA.  Full EOM.  Neck:  No lymphadenopathy, thyromegaly or bruit.  Cardiac:  Regular rate and rhythm.  Normal S1, S2.  No murmur, rub or gallop.  Lungs:  Clear to auscultation bilaterally.  No wheeze, rales or rhonchi.  Abdomen:  Non-tender, Non-distended.  Bowel sounds normoactive.  Extremities: Full peripheral pulses.  No clubbing, cyanosis or edema.  Skin: No ulceration, breakdown or rash.    CERVICAL SPINE EXAMINATION:  RANGE OF MOTION: The patient is able to flex, extend, rotate, and side bend without pain or difficulty.  There is full range of motion.  Patient is very slow and overly cautious in testing his own range of motion actively.  PALPATION: Exaggerated tenderness to very light palpation the posterior strap musculature of the neck.  STRENGTH: 5/5  bilateral trapezius, deltoid, triceps, biceps, wrist extensors/flexors, finger opposition.  SENSATION: Light touch and pinprick intact C5-T1 bilaterally.  REFLEXES: DTRs are 2+ bilaterally at biceps, triceps, and brachioradialis.  Russell's and palmomental are negative bilaterally.      Neurologic Exam:  Mental Status:    -Awake. Alert. Oriented to person, place & time.  -No word finding difficulties.  -No aphasia.  -No dysarthria.  -Follows simple commands.     CN II:  Full visual fields with confrontation.  Pupils equally reactive to light.  CN III, IV, VI:  Extraocular muscles function intact with no nystagmus.  CN V:  Facial sensory is symmetric.  CN VII:  Facial motor symmetric.  CN VIII:  Gross hearing intact bilaterally.  CN IX/X:  Palate elevates symmetrically.  CN XI:  Shoulder shrug symmetric.  CN XII:  Tongue is midline on protrusion.     Gait  -No signs of ataxia  -ambulates unassisted       Results Review:    I reviewed the patient's new clinical results and findings.      No components found for: A1C  No results found for: HDL, LDL  No components found for: B12  Lab Results   Component Value Date    TSH 1.17 05/08/2015       Assessment/Plan     Impression:  1.  Seizure disorder  2.  History of remote TBI  3.  Chronic cervical myofascial pain      Plan:  1.  Continue Keppra XR 1500 mg twice daily.  2.  Physical therapy ordered for chronic cervical myofascial pain.  I have reassured him that there is no concern with his neck popping and his tenderness is primarily related to muscle tension.  He needs to work on improving range of motion and have asked physical therapy to provide a home exercise program as well.  Modalities ad julia.  3.  I reviewed his previous cervical spine x-ray with him and explained to him that there is mild degenerative change between C2-3 in the facet and between C6-7, otherwise, is a relatively normal-appearing cervical spine plain film.  4.  Seizure precautions were discussed to  include no tub baths, no swimming, avoiding lack of sleep, and avoiding known triggers. Education given of things that may contribute to a seizure to include, but not limited to: stressful situations, fever, fatigue, lack of sleep, low blood sugar, hyperventilation, flashing lights, and caffeine. Instructions given to take seizure medications as prescribed. Education given to family member on what to do during a seizure and care following the seizure. Education given to contact this office prior to stopping or changing any medications.  5.  Patient will follow-up with me annually.  He will call for any concerns or questions in the interim.  At follow-up I will obtain a CBC with differential and CMP.  Therapeutic drug level testing is not necessarily required routinely.    Patient voices understanding agreement with the plan of care and will call for any concerns or questions in the interim.          Anastacio Prajapati PA-C  05/20/20  11:28

## 2020-06-09 ENCOUNTER — TELEPHONE (OUTPATIENT)
Dept: PHYSICAL THERAPY | Facility: CLINIC | Age: 40
End: 2020-06-09

## 2020-06-09 NOTE — TELEPHONE ENCOUNTER
Pt called saying he was out of town and had to miss his PT Evaluation appointment. He would like to call us back at the end of the month when he can reschedule. He is unable to attend PT at this time.

## 2020-06-16 ENCOUNTER — APPOINTMENT (OUTPATIENT)
Dept: GENERAL RADIOLOGY | Facility: HOSPITAL | Age: 40
End: 2020-06-16

## 2020-06-16 ENCOUNTER — HOSPITAL ENCOUNTER (EMERGENCY)
Facility: HOSPITAL | Age: 40
Discharge: LEFT BEFORE DISCHARGE | End: 2020-06-16
Admitting: FAMILY MEDICINE

## 2020-06-16 VITALS
TEMPERATURE: 98.4 F | DIASTOLIC BLOOD PRESSURE: 64 MMHG | OXYGEN SATURATION: 97 % | HEIGHT: 70 IN | BODY MASS INDEX: 31.5 KG/M2 | SYSTOLIC BLOOD PRESSURE: 106 MMHG | WEIGHT: 220 LBS | RESPIRATION RATE: 16 BRPM | HEART RATE: 62 BPM

## 2020-06-16 DIAGNOSIS — Z53.29 LEFT AGAINST MEDICAL ADVICE: Primary | ICD-10-CM

## 2020-06-16 PROCEDURE — 99211 OFF/OP EST MAY X REQ PHY/QHP: CPT

## 2020-06-16 PROCEDURE — 99282 EMERGENCY DEPT VISIT SF MDM: CPT

## 2020-06-16 RX ORDER — HYDROCODONE BITARTRATE AND ACETAMINOPHEN 7.5; 325 MG/1; MG/1
1 TABLET ORAL ONCE
Status: COMPLETED | OUTPATIENT
Start: 2020-06-16 | End: 2020-06-16

## 2020-06-16 RX ADMIN — HYDROCODONE BITARTRATE AND ACETAMINOPHEN 1 TABLET: 7.5; 325 TABLET ORAL at 18:05

## 2020-06-16 NOTE — ED NOTES
Pt states he does not want to stay for x-rays, he wants to follow up with ortho. Provider notified and pt signed out AMA.     Antonio Laguna, RN  06/16/20 2742

## 2020-06-16 NOTE — ED PROVIDER NOTES
Subjective   39 yom presents with c/o right knee pain.  He states he was walking yesterday and twisted the right knee.  He states he heard a 'pop' in the knee.  He is ambulating without difficulty.  He has +PMS of the RLE.  There is no swelling of the knee.          Review of Systems   Constitutional: Negative for activity change, appetite change, fatigue and fever.   HENT: Negative for congestion, ear pain, facial swelling and sore throat.    Eyes: Negative for discharge and visual disturbance.   Respiratory: Negative for apnea, chest tightness, shortness of breath, wheezing and stridor.    Cardiovascular: Negative for chest pain and palpitations.   Gastrointestinal: Negative for abdominal distention, abdominal pain, diarrhea, nausea and vomiting.   Genitourinary: Negative for difficulty urinating and dysuria.   Musculoskeletal: Negative for arthralgias and myalgias.   Skin: Negative for rash and wound.   Neurological: Negative for dizziness and seizures.   Psychiatric/Behavioral: Negative for agitation and confusion.       Past Medical History:   Diagnosis Date   • Anxiety disorder    • PTSD (post-traumatic stress disorder)    • Seizures (CMS/[facility])    • TBI (traumatic brain injury) (CMS/[facility])    • Thoracic injury    • Thoracic vertebral fracture (CMS/[facility])        Allergies   Allergen Reactions   • Amoxicillin Rash   • Codeine Rash   • Darvon [Propoxyphene] Rash   • Dilantin [Phenytoin] Rash   • Talwin [Pentazocine] Rash   • Toradol [Ketorolac Tromethamine] Rash   • Tramadol Rash       Past Surgical History:   Procedure Laterality Date   • CRANIOTOMY     • CRANIOTOMY     • ENDOSCOPY N/A 3/21/2020    Procedure: ESOPHAGOGASTRODUODENOSCOPY WITH ANESTHESIA;  Surgeon: Santino Patton MD;  Location: [facility];  Service: Gastroenterology;  Laterality: N/A;  preop; foreign body in esophagus  postop; food bolus removal   PCP none    • LUMBAR DISC SURGERY     • SHOULDER SURGERY     • THORACIC SPINE SURGERY         Family  History   Problem Relation Age of Onset   • COPD Mother    • COPD Father        Social History     Socioeconomic History   • Marital status:      Spouse name: Not on file   • Number of children: Not on file   • Years of education: Not on file   • Highest education level: Not on file   Tobacco Use   • Smoking status: Never Smoker   • Smokeless tobacco: Never Used   Substance and Sexual Activity   • Alcohol use: No   • Drug use: No   • Sexual activity: Defer           Objective   Physical Exam   Constitutional: He is oriented to person, place, and time. He appears well-developed.   HENT:   Head: Normocephalic.   Eyes: Pupils are equal, round, and reactive to light. EOM are normal.   Neck: Normal range of motion. Neck supple.   Cardiovascular: Normal rate and regular rhythm.   No murmur heard.  Pulmonary/Chest: Effort normal and breath sounds normal.   Abdominal: Soft. Bowel sounds are normal.   Musculoskeletal: Normal range of motion.        Right knee: He exhibits normal range of motion, no swelling, no deformity and no erythema. No tenderness found. No medial joint line and no lateral joint line tenderness noted.   Neurological: He is alert and oriented to person, place, and time.   Skin: Skin is warm and dry.   Psychiatric: He has a normal mood and affect.   Nursing note and vitals reviewed.      Procedures           ED Course  ED Course as of Jun 19 1320   Tue Jun 16, 2020   1840 Alerted to patient signing out AMA    [KS]      ED Course User Index  [KS] Paddy Wynn APRN                                           [facility]    Final diagnoses:   Left against medical advice            Paddy Wynn APRN  06/19/20 1320

## 2020-06-24 ENCOUNTER — NURSE TRIAGE (OUTPATIENT)
Dept: CALL CENTER | Facility: HOSPITAL | Age: 40
End: 2020-06-24

## 2020-06-25 NOTE — TELEPHONE ENCOUNTER
"    Reason for Disposition  • [1] Sharp FB (even if FB was removed) AND [2] any pain present now    Additional Information  • Negative: Doesn't sound like foreign body (FB) in the eye  • Negative: Foreign body is a piece of chemical  • Negative: Foreign body (FB) stuck on eyeball (Exception: contact lens)  • Negative: FB hit eye at high speed  (e.g., small metallic chip from hammering, lawnmower, BB gun, explosion)  • Negative: [1] Eye has been washed out > 30 minutes ago AND [2] feels like FB is still present  • Negative: [1] Eye pain AND [2] persists > 1 hour since irrigation (regardless of duration of flushing)  • Negative: [1] Tearing or blinking AND [2] persists > 1 hour since irrigation  (regardless of duration of flushing)  • Negative: [1] Blurred vision AND [2] persists > 1 hour since irrigation (regardless of duration of flushing)    Answer Assessment - Initial Assessment Questions  1. TYPE OF FOREIGN BODY: \"What got in the eye?\"        unknown  2. ONSET: \"When did it happen?\"       A couple days ago  3. MECHANISM: \"How did it happen?\"       unknown  4. VISION: \"Do you have blurred vision?\"       no  5. PAIN: \"Is it painful?\" If so, ask: \"How bad is the pain?\"  (Scale 1-10; or mild, moderate, severe)      Yes getting worse  6. CONTACTS: \"Do you wear contacts?\"       no  7. OTHER SYMPTOMS: \"Do you have any other symptoms?\"      no  8. PREGNANCY: \"Is there any chance you are pregnant?\" \"When was your last menstrual period?\"      no    Protocols used: EYE - FOREIGN BODY-ADULT-AH      "

## 2020-06-28 DIAGNOSIS — G40.309 GENERALIZED SEIZURE DISORDER (HCC): Primary | ICD-10-CM

## 2020-06-29 RX ORDER — LEVETIRACETAM 500 MG/1
TABLET, EXTENDED RELEASE ORAL
Qty: 180 TABLET | Refills: 5 | Status: SHIPPED | OUTPATIENT
Start: 2020-06-29 | End: 2021-02-04

## 2020-08-21 ENCOUNTER — NURSE TRIAGE (OUTPATIENT)
Dept: CALL CENTER | Facility: HOSPITAL | Age: 40
End: 2020-08-21

## 2020-08-21 NOTE — TELEPHONE ENCOUNTER
He received his flu shot, yesterday.He feels bad, He has a HA as well. No fever.     Reason for Disposition  • [1] COVID-19 infection suspected by caller or triager AND [2] mild symptoms (cough, fever, or others) AND [3] no complications or SOB    Additional Information  • Negative: SEVERE difficulty breathing (e.g., struggling for each breath, speaks in single words)  • Negative: Difficult to awaken or acting confused (e.g., disoriented, slurred speech)  • Negative: Bluish (or gray) lips or face now  • Negative: Shock suspected (e.g., cold/pale/clammy skin, too weak to stand, low BP, rapid pulse)  • Negative: Sounds like a life-threatening emergency to the triager  • Negative: [1] COVID-19 exposure AND [2] no symptoms  • Negative: COVID-19 and Breastfeeding, questions about  • Negative: [1] Adult with possible COVID-19 symptoms AND [2] triager concerned about severity of symptoms or other causes  • Negative: SEVERE or constant chest pain or pressure (Exception: mild central chest pain, present only when coughing)  • Negative: MODERATE difficulty breathing (e.g., speaks in phrases, SOB even at rest, pulse 100-120)  • Negative: Patient sounds very sick or weak to the triager  • Negative: MILD difficulty breathing (e.g., minimal/no SOB at rest, SOB with walking, pulse <100)  • Negative: Chest pain or pressure  • Negative: Fever > 103 F (39.4 C)  • Negative: [1] Fever > 101 F (38.3 C) AND [2] age > 60  • Negative: [1] Fever > 100.0 F (37.8 C) AND [2] bedridden (e.g., nursing home patient, CVA, chronic illness, recovering from surgery)  • Negative: HIGH RISK patient (e.g., age > 64 years, diabetes, heart or lung disease, weak immune system)  • Negative: Fever present > 3 days (72 hours)  • Negative: [1] Fever returns after gone for over 24 hours AND [2] symptoms worse or not improved  • Negative: [1] Continuous (nonstop) coughing interferes with work or school AND [2] no improvement using cough treatment per  "protocol    Answer Assessment - Initial Assessment Questions  1. SYMPTOMS: \"What symptoms are you concerned about?\"      *No Answer*  2. ONSET:  \"When did the symptoms start?\"      *No Answer*  3. TEMPERATURE: \"What is the temperature?\" \"How was it measured?\"       *No Answer*  4. COLD EXPOSURE: \"Was there an exposure to cold temperatures?\" (e.g., outside in the snow, swimming in cold water, air conditioning)      *No Answer*  5. OTHER SYMPTOMS: \"Are there any other symptoms?\" (e.g., fever, weakness, confusion, numbness of fingers or toes)      *No Answer*  6. PREGNANCY: \"Is there any chance you are pregnant?\" \"When was your last menstrual period?\"      *No Answer*    Answer Assessment - Initial Assessment Questions  1. COVID-19 DIAGNOSIS: \"Who made your Coronavirus (COVID-19) diagnosis?\" \"Was it confirmed by a positive lab test?\" If not diagnosed by a HCP, ask \"Are there lots of cases (community spread) where you live?\" (See public health department website, if unsure)      Not confirmed   2. ONSET: \"When did the COVID-19 symptoms start?\"       Today   3. WORST SYMPTOM: \"What is your worst symptom?\" (e.g., cough, fever, shortness of breath, muscle aches)       HA   4. COUGH: \"Do you have a cough?\" If so, ask: \"How bad is the cough?\"        No cough   5. FEVER: \"Do you have a fever?\" If so, ask: \"What is your temperature, how was it measured, and when did it start?\"      Unsure about fever  6. RESPIRATORY STATUS: \"Describe your breathing?\" (e.g., shortness of breath, wheezing, unable to speak)       Breathing is okay, something feels off.   7. BETTER-SAME-WORSE: \"Are you getting better, staying the same or getting worse compared to yesterday?\"  If getting worse, ask, \"In what way?\"      Worse   8. HIGH RISK DISEASE: \"Do you have any chronic medical problems?\" (e.g., asthma, heart or lung disease, weak immune system, etc.)      n  9. PREGNANCY: \"Is there any chance you are pregnant?\" \"When was your last menstrual " "period?\"      n  10. OTHER SYMPTOMS: \"Do you have any other symptoms?\"  (e.g., chills, fatigue, headache, loss of smell or taste, muscle pain, sore throat)        Ha    Protocols used: CORONAVIRUS (COVID-19) DIAGNOSED OR SUSPECTED-ADULT-AH, COLD EXPOSURE (HYPOTHERMIA)-ADULT-AH      "

## 2020-11-07 ENCOUNTER — NURSE TRIAGE (OUTPATIENT)
Dept: CALL CENTER | Facility: HOSPITAL | Age: 40
End: 2020-11-07

## 2020-11-07 NOTE — TELEPHONE ENCOUNTER
I have no symptoms, just wanting to be tested, sees Dr. Sawyer,  Told him to call MD Monday and review with him, and he can tell him if and where to be tested.     Reason for Disposition  • COVID-19 Testing, questions about    Additional Information  • Negative: SEVERE difficulty breathing (e.g., struggling for each breath, speaks in single words)  • Negative: Difficult to awaken or acting confused (e.g., disoriented, slurred speech)  • Negative: Bluish (or gray) lips or face now  • Negative: Shock suspected (e.g., cold/pale/clammy skin, too weak to stand, low BP, rapid pulse)  • Negative: Sounds like a life-threatening emergency to the triager  • Negative: [1] COVID-19 exposure AND [2] no symptoms  • Negative: COVID-19 and Breastfeeding, questions about  • Negative: [1] Adult with possible COVID-19 symptoms AND [2] triager concerned about severity of symptoms or other causes  • Negative: SEVERE or constant chest pain or pressure (Exception: mild central chest pain, present only when coughing)  • Negative: MODERATE difficulty breathing (e.g., speaks in phrases, SOB even at rest, pulse 100-120)  • Negative: Patient sounds very sick or weak to the triager  • Negative: MILD difficulty breathing (e.g., minimal/no SOB at rest, SOB with walking, pulse <100)  • Negative: Chest pain or pressure  • Negative: Fever > 103 F (39.4 C)  • Negative: [1] Fever > 101 F (38.3 C) AND [2] age > 60  • Negative: [1] Fever > 100.0 F (37.8 C) AND [2] bedridden (e.g., nursing home patient, CVA, chronic illness, recovering from surgery)  • Negative: HIGH RISK patient (e.g., age > 64 years, diabetes, heart or lung disease, weak immune system)  • Negative: Fever present > 3 days (72 hours)  • Negative: [1] Fever returns after gone for over 24 hours AND [2] symptoms worse or not improved  • Negative: [1] Continuous (nonstop) coughing interferes with work or school AND [2] no improvement using cough treatment per protocol  • Negative: [1]  "COVID-19 infection suspected by caller or triager AND [2] mild symptoms (cough, fever, or others) AND [3] no complications or SOB  • Negative: Cough present > 3 weeks  • Negative: [1] COVID-19 diagnosed by positive lab test AND [2] mild symptoms (e.g., cough, fever, others) AND [3] no complications or SOB  • Negative: [1] COVID-19 diagnosed by HCP (doctor, NP or PA) AND [2] mild symptoms (e.g., cough, fever, others) AND [3] no complications or SOB  • Negative: COVID-19 Home Isolation, questions about  • Negative: COVID-19 Prevention and Healthy Living, questions about  • Negative: COVID-19 Disease, questions about    Answer Assessment - Initial Assessment Questions  1. COVID-19 DIAGNOSIS: \"Who made your Coronavirus (COVID-19) diagnosis?\" \"Was it confirmed by a positive lab test?\" If not diagnosed by a HCP, ask \"Are there lots of cases (community spread) where you live?\" (See public health department website, if unsure)      No diagnosis  2. ONSET: \"When did the COVID-19 symptoms start?\"       No symtoms  3. WORST SYMPTOM: \"What is your worst symptom?\" (e.g., cough, fever, shortness of breath, muscle aches)      no  4. COUGH: \"Do you have a cough?\" If so, ask: \"How bad is the cough?\"        no  5. FEVER: \"Do you have a fever?\" If so, ask: \"What is your temperature, how was it measured, and when did it start?\"      no  6. RESPIRATORY STATUS: \"Describe your breathing?\" (e.g., shortness of breath, wheezing, unable to speak)       no  7. BETTER-SAME-WORSE: \"Are you getting better, staying the same or getting worse compared to yesterday?\"  If getting worse, ask, \"In what way?\"      no  8. HIGH RISK DISEASE: \"Do you have any chronic medical problems?\" (e.g., asthma, heart or lung disease, weak immune system, etc.)     Chronic bronchitis as a child  9. PREGNANCY: \"Is there any chance you are pregnant?\" \"When was your last menstrual period?\"      no  10. OTHER SYMPTOMS: \"Do you have any other symptoms?\"  (e.g., chills, " fatigue, headache, loss of smell or taste, muscle pain, sore throat)        no    Protocols used: CORONAVIRUS (COVID-19) DIAGNOSED OR SUSPECTED-ADULT-

## 2020-12-26 ENCOUNTER — NURSE TRIAGE (OUTPATIENT)
Dept: CALL CENTER | Facility: HOSPITAL | Age: 40
End: 2020-12-26

## 2020-12-26 ENCOUNTER — HOSPITAL ENCOUNTER (EMERGENCY)
Facility: HOSPITAL | Age: 40
Discharge: HOME | End: 2020-12-26
Attending: EMERGENCY MEDICINE | Admitting: EMERGENCY MEDICINE

## 2020-12-26 VITALS
RESPIRATION RATE: 15 BRPM | HEIGHT: 69 IN | SYSTOLIC BLOOD PRESSURE: 120 MMHG | TEMPERATURE: 99.1 F | HEART RATE: 80 BPM | BODY MASS INDEX: 33.18 KG/M2 | OXYGEN SATURATION: 95 % | WEIGHT: 224 LBS | DIASTOLIC BLOOD PRESSURE: 63 MMHG

## 2020-12-26 DIAGNOSIS — R09.81 NASAL CONGESTION: ICD-10-CM

## 2020-12-26 DIAGNOSIS — R43.0 ANOSMIA: ICD-10-CM

## 2020-12-26 DIAGNOSIS — R05.9 COUGH: Primary | ICD-10-CM

## 2020-12-26 LAB — SARS-COV-2 RNA PNL SPEC NAA+PROBE: DETECTED

## 2020-12-26 PROCEDURE — 99283 EMERGENCY DEPT VISIT LOW MDM: CPT

## 2020-12-26 PROCEDURE — C9803 HOPD COVID-19 SPEC COLLECT: HCPCS | Performed by: EMERGENCY MEDICINE

## 2020-12-26 PROCEDURE — 87635 SARS-COV-2 COVID-19 AMP PRB: CPT | Performed by: EMERGENCY MEDICINE

## 2020-12-26 RX ORDER — BENZONATATE 100 MG/1
100 CAPSULE ORAL 3 TIMES DAILY PRN
Qty: 15 CAPSULE | Refills: 0 | Status: SHIPPED | OUTPATIENT
Start: 2020-12-26 | End: 2020-12-31

## 2020-12-26 RX ORDER — BENZONATATE 100 MG/1
100 CAPSULE ORAL 3 TIMES DAILY PRN
Status: DISCONTINUED | OUTPATIENT
Start: 2020-12-26 | End: 2020-12-26

## 2020-12-26 RX ORDER — BENZONATATE 100 MG/1
100 CAPSULE ORAL ONCE
Status: COMPLETED | OUTPATIENT
Start: 2020-12-26 | End: 2020-12-26

## 2020-12-26 RX ADMIN — BENZONATATE 100 MG: 100 CAPSULE ORAL at 18:20

## 2020-12-26 NOTE — DISCHARGE INSTRUCTIONS
Please return immediately to the emergency department for any new or worsening symptoms. Please see your primary care provider in 2 days for re-evaluation of your symptoms.     
56

## 2020-12-26 NOTE — ED PROVIDER NOTES
Subjective   This is a 40-year-old gentleman with a past medical history of anxiety, seizures who presents the emergency department with a chief complaint of cough, body aches, anosmia.  States is gradual in onset over the past 2 days.  Is not constant.  Mild.  No exacerbating relieving factors and no associated symptoms.  Denies any headache or vision changes.  Has no voice changes.  No trouble swallowing.  No chest pain or shortness of breath.  No abdominal pain, nausea, vomiting, numbness, weakness, or paresthesias.    Past medical history: Seizures, anxiety  Social history: Denies tobacco use      History provided by:  Patient      Review of Systems   All other systems reviewed and are negative.      Past Medical History:   Diagnosis Date   • Anxiety disorder    • PTSD (post-traumatic stress disorder)    • Seizures (CMS/HCC)    • TBI (traumatic brain injury) (CMS/HCC)    • Thoracic injury    • Thoracic vertebral fracture (CMS/HCC)        Allergies   Allergen Reactions   • Amoxicillin Rash   • Codeine Rash   • Darvon [Propoxyphene] Rash   • Dilantin [Phenytoin] Rash   • Talwin [Pentazocine] Rash   • Toradol [Ketorolac Tromethamine] Rash   • Tramadol Rash       Past Surgical History:   Procedure Laterality Date   • CRANIOTOMY     • CRANIOTOMY     • ENDOSCOPY N/A 3/21/2020    Procedure: ESOPHAGOGASTRODUODENOSCOPY WITH ANESTHESIA;  Surgeon: Santino Patton MD;  Location: [facility];  Service: Gastroenterology;  Laterality: N/A;  preop; foreign body in esophagus  postop; food bolus removal   PCP none    • LUMBAR DISC SURGERY     • SHOULDER SURGERY     • THORACIC SPINE SURGERY         Family History   Problem Relation Age of Onset   • COPD Mother    • COPD Father        Social History     Socioeconomic History   • Marital status:      Spouse name: Not on file   • Number of children: Not on file   • Years of education: Not on file   • Highest education level: Not on file   Tobacco Use   • Smoking status: Never  Smoker   • Smokeless tobacco: Never Used   Substance and Sexual Activity   • Alcohol use: No   • Drug use: No   • Sexual activity: Defer           Objective   Physical Exam  Constitutional:       Appearance: Normal appearance.   HENT:      Head: Normocephalic and atraumatic.      Nose: Nose normal.      Mouth/Throat:      Mouth: Mucous membranes are moist.      Pharynx: Oropharynx is clear. No oropharyngeal exudate or posterior oropharyngeal erythema.      Comments: Full range of motion of neck without difficulty.  No submandibular or sublingual swelling.  Midline uvula.  Eyes:      Extraocular Movements: Extraocular movements intact.   Neck:      Musculoskeletal: Normal range of motion.   Cardiovascular:      Rate and Rhythm: Normal rate and regular rhythm.      Pulses: Normal pulses.      Heart sounds: Normal heart sounds. No murmur. No friction rub. No gallop.    Pulmonary:      Effort: Pulmonary effort is normal. No respiratory distress.      Breath sounds: Normal breath sounds. No stridor. No wheezing, rhonchi or rales.   Chest:      Chest wall: No tenderness.   Abdominal:      General: Abdomen is flat. Bowel sounds are normal. There is no distension.      Palpations: Abdomen is soft. There is no mass.      Tenderness: There is no abdominal tenderness. There is no guarding or rebound.      Hernia: No hernia is present.   Musculoskeletal: Normal range of motion.         General: No swelling, tenderness, deformity or signs of injury.   Skin:     General: Skin is warm and dry.      Capillary Refill: Capillary refill takes less than 2 seconds.      Coloration: Skin is not jaundiced or pale.      Findings: No bruising, erythema or rash.   Neurological:      General: No focal deficit present.      Mental Status: He is alert and oriented to person, place, and time.   Psychiatric:         Mood and Affect: Mood normal.         Behavior: Behavior normal.         Thought Content: Thought content normal.         Judgment:  Judgment normal.         Procedures           ED Course                                           MDM  Number of Diagnoses or Management Options  Anosmia: new and requires workup  Cough: new and requires workup  Nasal congestion: new and requires workup  Diagnosis management comments: Patient presents with cough, body aches, and nausea.  Upon arrival he is in no acute distress vital signs are reassuring.  No clinical signs of meningitis on exam.  No signs of Ludewig's angina.  Low concern for retropharyngeal abscess with full range of motion of neck without difficulty.  No signs of peritonsillar abscess on exam.  No signs of epiglottitis on exam.  No signs of pneumonia on exam.  No chest pain or shortness of breath.  Offered him an x-ray and he declined.  He is tested for COVID-19.  He is discharged in good condition with normal vitals and he is given commonsense return precautions which he verbalizes understanding of.  He will be called with his COVID-19 results.       Amount and/or Complexity of Data Reviewed  Clinical lab tests: ordered    Risk of Complications, Morbidity, and/or Mortality  Presenting problems: moderate  Diagnostic procedures: low  Management options: moderate    Patient Progress  Patient progress: stable      Final diagnoses:   Cough   Anosmia            Otilio Charles MD  12/27/20 [id number]

## 2020-12-26 NOTE — TELEPHONE ENCOUNTER
Reviewed guideline with caller, advises he call his PCP, advised he can go to Urgent Care. Caller agrees to follow care advice.     Reason for Disposition  • [1] HIGH RISK patient AND [2] influenza is widespread in the community AND [3] ONE OR MORE respiratory symptoms: cough, sore throat, runny or stuffy nose    Additional Information  • Negative: SEVERE difficulty breathing (e.g., struggling for each breath, speaks in single words)  • Negative: Difficult to awaken or acting confused (e.g., disoriented, slurred speech)  • Negative: Bluish (or gray) lips or face now  • Negative: Shock suspected (e.g., cold/pale/clammy skin, too weak to stand, low BP, rapid pulse)  • Negative: Sounds like a life-threatening emergency to the triager  • Negative: [1] COVID-19 exposure AND [2] no symptoms  • Negative: [1] Lives with someone known to have influenza (flu test positive) AND [2] flu-like symptoms (e.g., cough, runny nose, sore throat, SOB; with or without fever)  • Negative: [1] Adult with possible COVID-19 symptoms AND [2] triager concerned about severity of symptoms or other causes  • Negative: Immunization reaction suspected (e.g., fever, headache, muscle aches occurring during days 1-3 days after immunization)  • Negative: COVID-19 and breastfeeding, questions about  • Negative: SEVERE or constant chest pain or pressure (Exception: mild central chest pain, present only when coughing)  • Negative: MODERATE difficulty breathing (e.g., speaks in phrases, SOB even at rest, pulse 100-120)  • Negative: [1] Headache AND [2] stiff neck (can't touch chin to chest)  • Negative: MILD difficulty breathing (e.g., minimal/no SOB at rest, SOB with walking, pulse <100)  • Negative: Chest pain or pressure  • Negative: Patient sounds very sick or weak to the triager  • Negative: Fever > 103 F (39.4 C)  • Negative: [1] Fever > 101 F (38.3 C) AND [2] age > 60  • Negative: [1] Fever > 100.0 F (37.8 C) AND [2] bedridden (e.g., nursing home  "patient, CVA, chronic illness, recovering from surgery)  • Negative: [1] HIGH RISK patient (e.g., age > 64 years, diabetes, heart or lung disease, weak immune system) AND [2] new or worsening symptoms    Answer Assessment - Initial Assessment Questions  1. COVID-19 DIAGNOSIS: \"Who made your Coronavirus (COVID-19) diagnosis?\" \"Was it confirmed by a positive lab test?\" If not diagnosed by a HCP, ask \"Are there lots of cases (community spread) where you live?\" (See public health department website, if unsure)      Community spread  2. COVID-19 EXPOSURE: \"Was there any known exposure to COVID before the symptoms began?\" CDC Definition of close contact: within 6 feet (2 meters) for a total of 15 minutes or more over a 24-hour period.       no  3. ONSET: \"When did the COVID-19 symptoms start?\"       Tuesday night   4. WORST SYMPTOM: \"What is your worst symptom?\" (e.g., cough, fever, shortness of breath, muscle aches)      Loss of taste   5. COUGH: \"Do you have a cough?\" If so, ask: \"How bad is the cough?\"        no  6. FEVER: \"Do you have a fever?\" If so, ask: \"What is your temperature, how was it measured, and when did it start?\"      no  7. RESPIRATORY STATUS: \"Describe your breathing?\" (e.g., shortness of breath, wheezing, unable to speak)       No shortness of breath   8. BETTER-SAME-WORSE: \"Are you getting better, staying the same or getting worse compared to yesterday?\"  If getting worse, ask, \"In what way?\"      Better today   9. HIGH RISK DISEASE: \"Do you have any chronic medical problems?\" (e.g., asthma, heart or lung disease, weak immune system, obesity, etc.)      Asthma, bronchitis   10. PREGNANCY: \"Is there any chance you are pregnant?\" \"When was your last menstrual period?\"        na  11. OTHER SYMPTOMS: \"Do you have any other symptoms?\"  (e.g., chills, fatigue, headache, loss of smell or taste, muscle pain, sore throat; new loss of smell or taste especially support the diagnosis of COVID-19)        Sore " throat, chill    Protocols used: CORONAVIRUS (COVID-19) DIAGNOSED OR SUSPECTED-ADULT-

## 2020-12-27 ENCOUNTER — TELEPHONE (OUTPATIENT)
Dept: EMERGENCY DEPT | Facility: HOSPITAL | Age: 40
End: 2020-12-27

## 2020-12-27 NOTE — ED NOTES
Patient is a 40 year old male that presents to ER with complaints of cough, body aches, loss of taste. Patient reports that on Wednesday he got wet and feels that all his symptoms are due to this. Patient reports he called the health line and was told  That he needed covid testing      Jamie Villalba RN  12/26/20 [id number]

## 2021-01-28 ENCOUNTER — NURSE TRIAGE (OUTPATIENT)
Dept: CALL CENTER | Facility: HOSPITAL | Age: 41
End: 2021-01-28
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
## 2021-02-01 ENCOUNTER — APPOINTMENT (OUTPATIENT)
Dept: GENERAL RADIOLOGY | Facility: HOSPITAL | Age: 41
End: 2021-02-01

## 2021-02-01 PROCEDURE — 71046 X-RAY EXAM CHEST 2 VIEWS: CPT

## 2021-02-04 DIAGNOSIS — G40.309 GENERALIZED SEIZURE DISORDER (HCC): ICD-10-CM

## 2021-02-05 RX ORDER — LEVETIRACETAM 500 MG/1
TABLET, EXTENDED RELEASE ORAL
Qty: 180 TABLET | Refills: 2 | Status: SHIPPED | OUTPATIENT
Start: 2021-02-05 | End: 2021-05-21 | Stop reason: SDUPTHER

## 2021-04-08 ENCOUNTER — NURSE TRIAGE (OUTPATIENT)
Dept: CALL CENTER | Facility: HOSPITAL | Age: 41
End: 2021-04-08

## 2021-04-08 NOTE — TELEPHONE ENCOUNTER
Guadalupe states that he has been having issues with gerd and trouble swallowing for several months.  He had an EGD a few months ago that was normal.  He states that it still feels like food and meds don't go down sometimes.  He has an appt with his PCP but it is in 2 weeks.  He has been using TUMS frequently, asking if there is anything else that might help until he sees his PCP.  He has been avoiding spicy foods.  Discussed omeprazole and other OTC meds.  Questions answered.  Reason for Disposition  • Difficulty swallowing is a chronic symptom (recurrent or ongoing AND present > 4 weeks)    Additional Information  • Negative: [1] Severe difficulty swallowing (e.g., drooling or spitting) AND [2] started suddenly after taking a medicine or allergic food  • Negative: Wheezing, stridor, hoarseness, or difficulty breathing  • Negative: [1] Swollen tongue AND [2] sudden onset  • Negative: Sounds like a life-threatening emergency to the triager  • Negative: Mouth ulcers are seen  • Negative: Sore throat (throat pain with swallowing)  • Negative: Swallowed a (non-edible) foreign body  • Negative: Feeding tube, questions or concerns related to  • Negative: Swelling of tongue  • Negative: SEVERE difficulty swallowing (e.g., drooling or spitting, can't swallow water)  • Negative: [1] Symptoms of blocked esophagus (e.g., can't swallow normal secretions, drooling) AND [2] present now  • Negative: Symptoms of food or bone stuck in throat or esophagus (e.g., pain in throat or chest, FB sensation, blood-tinged saliva)  • Negative: SEVERE symptoms of pill stuck in throat or esophagus (e.g., severe pain, bleeding, or inability to swallow liquids)  • Negative: [1] Drinking very little AND [2] dehydration suspected (e.g., no urine > 12 hours, very dry mouth, very lightheaded)  • Negative: [1] Refuses to drink anything AND [2] for > 12 hours  • Negative: Patient sounds very sick or weak to the triager  • Negative: Fever > 100.4 F (38.0  "C)  • Negative: [1] Coughing spells AND [2] occur during eating/feedings or within 2 hours  • Negative: [1] Symptoms of pill stuck in throat or esophagus (e.g., pain in throat or chest, FB sensation) AND [2] no relief after using CARE ADVICE  • Negative: Weak immune system (e.g., HIV positive, cancer chemo, splenectomy, organ transplant, chronic steroids)  • Negative: [1] Swallowing difficulty AND [2] cause unknown (Exception: difficulty swallowing is a chronic symptom)    Answer Assessment - Initial Assessment Questions  1. SYMPTOM: \"Are you having difficulty swallowing liquids, solids, or both?\"      solids  2. ONSET: \"When did the swallowing problems begin?\"       Several months, egd was normal  3. CAUSE: \"What do you think is causing the problem?\"       Not sure  4. CHRONIC/RECURRENT: \"Is this a new problem for you?\"  If no, ask: \"How long have you had this problem?\" (e.g., days, weeks, months)       months  5. OTHER SYMPTOMS: \"Do you have any other symptoms?\" (e.g., difficulty breathing, sore throat, swollen tongue, chest pain)      Acid reflux  6. PREGNANCY: \"Is there any chance you are pregnant?\" \"When was your last menstrual period?\"      na    Protocols used: SWALLOWING DIFFICULTY-ADULT-AH    "

## 2021-04-24 ENCOUNTER — NURSE TRIAGE (OUTPATIENT)
Dept: CALL CENTER | Facility: HOSPITAL | Age: 41
End: 2021-04-24

## 2021-04-25 NOTE — TELEPHONE ENCOUNTER
"States he has been having symptoms and he had one situation where he thought it was a heart situation but most of it has felt like indigestion or acid reflux and like he can't produce a good belch. States he takes Tums sometimes. States it doesn't come up real far or down into his stomach. States has been going on all week.     States 5 days ago he was almost asleep and felt like something shocked him in his sternum.     States he is calling now because he is \"feeling a little tight in the chest\". States he is having a little shortness of breath but he isn't sure. Denies nausea or sweating. Denies any pain in arm, shoulder, jaw, or neck. Had to have EGD previously d/t food swallowing issue and foreign body. States when he is able to belch it does help. Had covid in January.    Discussed GasX OTC. Discussed if pain occurs, worsens, change in condition, etc to go to ER for evaluation.     Reason for Disposition  • [1] Chest pain(s) lasting a few seconds AND [2] persists > 3 days    Additional Information  • Negative: Severe difficulty breathing (e.g., struggling for each breath, speaks in single words)  • Negative: Difficult to awaken or acting confused (e.g., disoriented, slurred speech)  • Negative: Shock suspected (e.g., cold/pale/clammy skin, too weak to stand, low BP, rapid pulse)  • Negative: [1] Chest pain lasts > 5 minutes AND [2] age > 45  • Negative: [1] Chest pain lasts > 5 minutes AND [2] age > 30 AND [3] one or more cardiac risk factors (e.g., diabetes, high blood pressure, high cholesterol, smoker, or strong family history of heart disease)  • Negative: [1] Chest pain lasts > 5 minutes AND [2] history of heart disease (i.e., angina, heart attack, heart failure, bypass surgery, takes nitroglycerin)  • Negative: [1] Chest pain lasts > 5 minutes AND [2] described as crushing, pressure-like, or heavy  • Negative: Passed out (i.e., lost consciousness, collapsed and was not responding)  • Negative: Heart " "beating < 50 beats per minute OR > 140 beats per minute  • Negative: Visible sweat on face or sweat dripping down face  • Negative: Sounds like a life-threatening emergency to the triager  • Negative: Followed a chest injury  • Negative: SEVERE chest pain  • Negative: [1] Chest pain (or \"angina\") comes and goes AND [2] is happening more often (increasing in frequency) or getting worse (increasing in severity)  • Negative: Pain also in shoulder(s) or arm(s) or jaw  • Negative: Difficulty breathing  • Negative: Dizziness or lightheadedness  • Negative: Coughing up blood  • Negative: Cocaine use within last 3 days  • Negative: Major surgery in the past month  • Negative: Hip or leg fracture (broken bone) in past month (or had cast on leg or ankle in past month)  • Negative: Illness requiring prolonged bedrest in past month (e.g., immobilization, long hospital stay)  • Negative: Long-distance travel in past month (e.g., car, bus, train, plane; with trip lasting 6 or more hours)  • Negative: History of prior \"blood clot\" in leg or lungs (i.e., deep vein thrombosis, pulmonary embolism)  • Negative: History of inherited increased risk of blood clots (e.g., Factor 5 Leiden, Anti-thrombin 3, Protein C or Protein S deficiency, Prothrombin mutation)  • Negative: [1] Chest pain lasts > 5 minutes AND [2] occurred in past 3 days (72 hours)  • Negative: Taking a deep breath makes pain worse  • Negative: Patient sounds very sick or weak to the triager  • Negative: Cancer treatment in the past two months (or has cancer now)  • Negative: [1] Chest pain lasts > 5 minutes AND [2] occurred > 3 days ago (72 hours) AND [3] NO chest pain or cardiac symptoms now  • Negative: [1] Chest pain lasting < 5 minutes AND [2] NO chest pain or cardiac symptoms (e.g., breathing difficulty, sweating) now  • Negative: Fever > 100.4 F (38.0 C)  • Negative: Rash in same area as pain (may be described as \"small blisters\")  • Negative: [1] Chest pain " "lasting < 5 minutes AND [2] has not taken prescribed nitroglycerin  • Negative: [1] Chest pain lasting < 5 minutes AND [2] completely gone after taking nitroglycerin  • Negative: [1] Chest pain from known angina comes and goes AND [2] is NOT happening more often (increasing in frequency) or getting worse (increasing in severity)  • Negative: [1] Chest pain(s) lasting a few seconds from coughing AND [2] persists > 3 days  • Negative: Chest pain(s) lasting a few seconds from coughing  • Negative: Chest pain(s) lasting a few seconds  • Negative: [1] Patient says chest pain feels exactly the same as previously diagnosed \"heartburn\" AND [2] describes burning in chest AND [3] accompanying sour taste in mouth    Answer Assessment - Initial Assessment Questions  1. LOCATION: \"Where does it hurt?\"        Sternal area  2. RADIATION: \"Does the pain go anywhere else?\" (e.g., into neck, jaw, arms, back)      no  3. ONSET: \"When did the chest pain begin?\" (Minutes, hours or days)       See note  4. PATTERN \"Does the pain come and go, or has it been constant since it started?\"  \"Does it get worse with exertion?\"       No worse with exertion  5. DURATION: \"How long does it last\" (e.g., seconds, minutes, hours)      Constant for 5-7 days  6. SEVERITY: \"How bad is the pain?\"  (e.g., Scale 1-10; mild, moderate, or severe)     - MILD (1-3): doesn't interfere with normal activities      - MODERATE (4-7): interferes with normal activities or awakens from sleep     - SEVERE (8-10): excruciating pain, unable to do any normal activities        0  7. CARDIAC RISK FACTORS: \"Do you have any history of heart problems or risk factors for heart disease?\" (e.g., angina, prior heart attack; diabetes, high blood pressure, high cholesterol, smoker, or strong family history of heart disease)      Father and grandfather had heart issues  8. PULMONARY RISK FACTORS: \"Do you have any history of lung disease?\"  (e.g., blood clots in lung, asthma, " "emphysema, birth control pills)      no  9. CAUSE: \"What do you think is causing the chest pain?\"      Indigestion/gas pain  10. OTHER SYMPTOMS: \"Do you have any other symptoms?\" (e.g., dizziness, nausea, vomiting, sweating, fever, difficulty breathing, cough)        no  11. PREGNANCY: \"Is there any chance you are pregnant?\" \"When was your last menstrual period?\"        n/a    Protocols used: CHEST PAIN-ADULT-AH      "

## 2021-05-21 ENCOUNTER — OFFICE VISIT (OUTPATIENT)
Dept: NEUROLOGY | Facility: CLINIC | Age: 41
End: 2021-05-21

## 2021-05-21 VITALS
OXYGEN SATURATION: 98 % | DIASTOLIC BLOOD PRESSURE: 72 MMHG | HEIGHT: 70 IN | SYSTOLIC BLOOD PRESSURE: 104 MMHG | BODY MASS INDEX: 33.07 KG/M2 | WEIGHT: 231 LBS | HEART RATE: 88 BPM

## 2021-05-21 DIAGNOSIS — S06.9X9S TRAUMATIC BRAIN INJURY WITH LOSS OF CONSCIOUSNESS, SEQUELA (HCC): ICD-10-CM

## 2021-05-21 DIAGNOSIS — G40.309 GENERALIZED SEIZURE DISORDER (HCC): Primary | ICD-10-CM

## 2021-05-21 PROCEDURE — 99213 OFFICE O/P EST LOW 20 MIN: CPT | Performed by: PHYSICIAN ASSISTANT

## 2021-05-21 RX ORDER — LEVETIRACETAM 500 MG/1
1500 TABLET, EXTENDED RELEASE ORAL 2 TIMES DAILY
Qty: 180 TABLET | Refills: 11 | Status: SHIPPED | OUTPATIENT
Start: 2021-05-21 | End: 2022-01-24 | Stop reason: SDUPTHER

## 2021-05-21 RX ORDER — CLONAZEPAM 1 MG/1
TABLET ORAL
COMMUNITY
Start: 2021-05-10

## 2021-05-21 NOTE — PROGRESS NOTES
Neurology Progress Note      Chief Complaint:    Seizure disorder  History of TBI  Chronic neck pain    Subjective     Subjective:  Patient returns to clinic today for annual seizure review.  Patient is continued to be maintained with Keppra 1500 mg twice daily.  He denies any seizure activity since last being seen.  Patient has the unfortunate history of remote TBI with resultant bifrontal craniotomy and posttraumatic seizure disorder.  The patient denies any complaints since last being seen last year and is tolerating medication without difficulty.  His family physician, Dr. Sawyer, routinely performs blood work for surveillance.      Past Medical History:   Diagnosis Date   • Anxiety disorder    • Asthma    • PTSD (post-traumatic stress disorder)    • Seizures (CMS/HCC)    • TBI (traumatic brain injury) (CMS/HCC)    • Thoracic injury    • Thoracic vertebral fracture (CMS/HCC)      Past Surgical History:   Procedure Laterality Date   • CRANIOTOMY     • CRANIOTOMY     • ENDOSCOPY N/A 3/21/2020    Procedure: ESOPHAGOGASTRODUODENOSCOPY WITH ANESTHESIA;  Surgeon: Santino Patton MD;  Location: [facility];  Service: Gastroenterology;  Laterality: N/A;  preop; foreign body in esophagus  postop; food bolus removal   PCP none    • LUMBAR DISC SURGERY     • SHOULDER SURGERY     • THORACIC SPINE SURGERY       Family History   Problem Relation Age of Onset   • COPD Mother    • COPD Father      Social History     Tobacco Use   • Smoking status: Never Smoker   • Smokeless tobacco: Never Used   Vaping Use   • Vaping Use: Never used   Substance Use Topics   • Alcohol use: No   • Drug use: No       Medications:  Current Outpatient Medications   Medication Sig Dispense Refill   • buprenorphine-naloxone (SUBOXONE) 8-2 MG per SL tablet Place 1 tablet under the tongue 2 (Two) Times a Day.     • diazepam (VALIUM) 5 MG tablet Take 5 mg by mouth 2 (Two) Times a Day.     • fluticasone (FLONASE) 50 MCG/ACT nasal spray      •  levETIRAcetam XR (KEPPRA XR) 500 MG 24 hr tablet TAKE 3 TABLETS BY MOUTH 2 (TWO) TIMES A  tablet 2   • loratadine (CLARITIN) 10 MG tablet Take 1 tablet by mouth Daily. 30 tablet 0   • methocarbamol (ROBAXIN) 750 MG tablet TAKE ONE TABLET BY MOUTH FOUR TIMES DAILY AS NEEDED FOR MUSCLE SPAMS  0   • polyethylene glycol (MIRALAX) packet Take 17 g by mouth Daily. 12 each 0   • promethazine (PHENERGAN) 25 MG tablet Take 1 tablet by mouth Every 6 (Six) Hours As Needed for Nausea or Vomiting. 12 tablet 0   • triamcinolone (KENALOG) 0.1 % cream      • VENTOLIN  (90 Base) MCG/ACT inhaler        No current facility-administered medications for this visit.       Allergies:    Amoxicillin, Codeine, Darvon [propoxyphene], Dilantin [phenytoin], Talwin [pentazocine], Toradol [ketorolac tromethamine], and Tramadol    Review of Systems:   -A 14 point review of systems is completed and is negative.      Objective      Vital Signs       Physical Exam:    General Exam:  Head:  Normocephalic, atraumatic.  HEENT: PERRLA.  Full EOM.  Neck:  No lymphadenopathy, thyromegaly or bruit.  Cardiac:  Regular rate and rhythm.  Normal S1, S2.  No murmur, rub or gallop.  Lungs:  Clear to auscultation bilaterally.  No wheeze, rales or rhonchi.  Abdomen:  Non-tender, Non-distended.  Bowel sounds normoactive.  Extremities: Full peripheral pulses.  No clubbing, cyanosis or edema.  Skin: No ulceration, breakdown or rash.      Neurologic Exam:  CERVICAL SPINE EXAMINATION:  RANGE OF MOTION: The patient is able to flex, extend, rotate, and side bend without pain or difficulty.  There is full range of motion.    PALPATION: Nontender to palpation midline and through upper cervical musculature.  STRENGTH: 5/5 bilateral trapezius, deltoid, triceps, biceps, wrist extensors/flexors, finger opposition.  SENSATION: Light touch and pinprick intact C5-T1 bilaterally.  REFLEXES: DTRs are 2+ bilaterally at biceps, triceps, and brachioradialis.  Russell's  and palmomental are negative bilaterally.  SPECIAL TESTS:  Tinel's & Phalen's are negative. Spurling's is negative bilaterally.     Coordination:  -Finger to nose intact BUEs  -Heel to shin intact BLEs  -No ataxia     Gait  -No signs of ataxia  -ambulates unassisted       Results Review:        Assessment/Plan     Impression:  1.  Seizure disorder  2.  History of TBI  3.  History of bifrontal craniotomy      Plan:  1.  Continue Keppra 1500 mg twice daily for seizure prophylaxis.    2.  Seizure precautions were discussed to include no tub baths, no swimming, avoiding lack of sleep, and avoiding known triggers. Education given of things that may contribute to a seizure to include, but not limited to: stressful situations, fever, fatigue, lack of sleep, low blood sugar, hyperventilation, flashing lights, and caffeine. Instructions given to take seizure medications as prescribed. Education given to family member on what to do during a seizure and care following the seizure. Education given to contact this office prior to stopping or changing any medications.    3.  Continue with screening laboratory evaluation through PCP.    4.  Call for any breakthrough seizure activity.    5.  Follow-up in 1 year.          Anastacio Prajapati PA-C  05/21/21  11:03 CDT

## 2021-07-27 ENCOUNTER — NURSE TRIAGE (OUTPATIENT)
Dept: CALL CENTER | Facility: HOSPITAL | Age: 41
End: 2021-07-27

## 2021-07-28 NOTE — TELEPHONE ENCOUNTER
"Congestion, cough, no fever    Reason for Disposition  • Lots of coughing    Additional Information  • Negative: SEVERE difficulty breathing (e.g., struggling for each breath, speaks in single words)  • Negative: Sounds like a life-threatening emergency to the triager  • Negative: [1] Sinus infection AND [2] taking an antibiotic AND [3] symptoms continue  • Negative: [1] Difficulty breathing AND [2] not from stuffy nose (e.g., not relieved by cleaning out the nose)  • Negative: [1] SEVERE headache AND [2] fever  • Negative: [1] Redness or swelling on the cheek, forehead or around the eye AND [2] fever  • Negative: Fever > 104 F (40 C)  • Negative: Patient sounds very sick or weak to the triager  • Negative: [1] SEVERE pain AND [2] not improved 2 hours after pain medicine  • Negative: [1] Redness or swelling on the cheek, forehead or around the eye AND [2] no fever  • Negative: [1] Fever > 101 F (38.3 C) AND [2] age > 60 years  • Negative: [1] Fever > 100.0 F (37.8 C) AND [2] bedridden (e.g., nursing home patient, CVA, chronic illness, recovering from surgery)  • Negative: [1] Fever > 100.0 F (37.8 C) AND [2] diabetes mellitus or weak immune system (e.g., HIV positive, cancer chemo, splenectomy, organ transplant, chronic steroids)  • Negative: Fever present > 3 days (72 hours)  • Negative: [1] Fever returns after gone for over 24 hours AND [2] symptoms worse or not improved  • Negative: [1] Sinus pain (not just congestion) AND [2] fever  • Negative: Earache  • Negative: [1] Sinus congestion (pressure, fullness) AND [2] present > 10 days  • Negative: [1] Nasal discharge AND [2] present > 10 days  • Negative: [1] Using nasal washes and pain medicine > 24 hours AND [2] sinus pain (around cheekbone or eye) persists    Answer Assessment - Initial Assessment Questions  1. LOCATION: \"Where does it hurt?\"        Sinus area  2. ONSET: \"When did the sinus pain start?\"  (e.g., hours, days)       4-5 days ago  3. SEVERITY: \"How " "bad is the pain?\"   (Scale 1-10; mild, moderate or severe)    - MILD (1-3): doesn't interfere with normal activities     - MODERATE (4-7): interferes with normal activities (e.g., work or school) or awakens from sleep    - SEVERE (8-10): excruciating pain and patient unable to do any normal activities         mod  4. RECURRENT SYMPTOM: \"Have you ever had sinus problems before?\" If Yes, ask: \"When was the last time?\" and \"What happened that time?\"       no  5. NASAL CONGESTION: \"Is the nose blocked?\" If Yes, ask: \"Can you open it or must you breathe through the mouth?\"      yes  6. NASAL DISCHARGE: \"Do you have discharge from your nose?\" If so ask, \"What color?\"      n/a  7. FEVER: \"Do you have a fever?\" If Yes, ask: \"What is it, how was it measured, and when did it start?\"       no  8. OTHER SYMPTOMS: \"Do you have any other symptoms?\" (e.g., sore throat, cough, earache, difficulty breathing)      cough  9. PREGNANCY: \"Is there any chance you are pregnant?\" \"When was your last menstrual period?\"      no    Protocols used: SINUS PAIN OR CONGESTION-ADULT-AH      "

## 2021-08-18 ENCOUNTER — NURSE TRIAGE (OUTPATIENT)
Dept: CALL CENTER | Facility: HOSPITAL | Age: 41
End: 2021-08-18

## 2021-08-19 NOTE — TELEPHONE ENCOUNTER
Reason for Disposition  • [1] Caller concerned that exposure to COVID-19 occurred BUT [2] does not meet COVID-19 EXPOSURE criteria from CDC    Additional Information  • Negative: COVID-19 lab test positive  • Negative: [1] Lives with someone known to have influenza (flu test positive) AND [2] flu-like symptoms (e.g., cough, runny nose, sore throat, SOB; with or without fever)  • Negative: [1] Symptoms of COVID-19 (e.g., cough, fever, SOB, or others) AND [2] HCP diagnosed COVID-19 based on symptoms  • Negative: [1] Symptoms of COVID-19 (e.g., cough, fever, SOB, or others) AND [2] lives in an area with community spread  • Negative: [1] Symptoms of COVID-19 (e.g., cough, fever, SOB, or others) AND [2] within 14 days of EXPOSURE (close contact) with diagnosed or suspected COVID-19 patient  • Negative: [1] Symptoms of COVID-19 (e.g., cough, fever, SOB, or others) AND [2] within 14 days of travel from high-risk area for COVID-19 community spread (identified by CDC)  • Negative: [1] Difficulty breathing (shortness of breath) occurs AND [2] onset > 14 days after COVID-19 EXPOSURE (Close Contact)  • Negative: [1] Dry cough occurs AND [2] onset > 14 days after COVID-19 EXPOSURE  • Negative: [1] Wet cough (i.e., white-yellow, yellow, green, or traci colored sputum) AND [2] onset > 14 days after COVID-19 EXPOSURE  • Negative: [1] Common cold symptoms AND [2] onset > 14 days after COVID-19 EXPOSURE  • Negative: [1] COVID-19 vaccine series completed (fully vaccinated) AND [2] COVID-19 EXPOSURE AND [3] no symptoms  • Negative: COVID-19 vaccine reaction suspected (e.g., fever, headache, muscle aches) occurring during days 1-3 after getting vaccine  • Negative: COVID-19 vaccine, questions about  • Negative: [1] CLOSE CONTACT COVID-19 EXPOSURE within last 14 days AND [2] needs COVID-19 lab test to return to work AND [3] NO symptoms  • Negative: [1] CLOSE CONTACT COVID-19 EXPOSURE within last 14 days AND [2] exposed person is a  " (e.g., police or paramedic) AND [3] NO symptoms  • Negative: [1] CLOSE CONTACT COVID-19 EXPOSURE within last 14 days AND [2] exposed person is a healthcare worker who was NOT using all recommended personal protective equipment (e.g., a respirator-N95 mask, eye protection, gloves, and gown) AND [3] NO symptoms  • Negative: [1] Living or working in a correctional facility, long-term care facility, or shelter (i.e., congregate setting; densely populated) AND [2] where an outbreak has occurred AND [3] NO symptoms  • Negative: [1] CLOSE CONTACT COVID-19 EXPOSURE within last 14 days AND [2] NO symptoms  • Negative: [1] COVID-19 EXPOSURE AND [2] 15 or more days ago AND [3] NO symptoms  • Negative: [1] Living in area with community spread (identified by local PHD) BUT [2] NO symptoms  • Negative: [1] Travel from area with community spread (identified by CDC) AND [2] within last 14 days BUT [3] NO symptoms  • Negative: [1] No COVID-19 EXPOSURE BUT [2] living with someone who was exposed and who has no symptoms of COVID-19    Answer Assessment - Initial Assessment Questions  1. COVID-19 CLOSE CONTACT: \"Who is the person with the confirmed or suspected COVID-19 infection that you were exposed to?\"      A friend  2. PLACE of CONTACT: \"Where were you when you were exposed to COVID-19?\" (e.g., home, school, medical waiting room; which city?)      home  3. TYPE of CONTACT: \"How much contact was there?\" (e.g., sitting next to, live in same house, work in same office, same building)      social  4. DURATION of CONTACT: \"How long were you in contact with the COVID-19 patient?\" (e.g., a few seconds, passed by person, a few minutes, 15 minutes or longer, live with the patient)     no  5. MASK: \"Were you wearing a mask?\" \"Was the other person wearing a mask?\" Note: wearing a mask reduces the risk of an otherwise close contact.     no  6. DATE of CONTACT: \"When did you have contact with a COVID-19 patient?\" (e.g., how " "many days ago)     today  7. COMMUNITY SPREAD: \"Are there lots of cases of COVID-19 (community spread) where you live?\" (See public health department website, if unsure)        yes  8. SYMPTOMS: \"Do you have any symptoms?\" (e.g., fever, cough, breathing difficulty, loss of taste or smell)      no  9. PREGNANCY OR POSTPARTUM: \"Is there any chance you are pregnant?\" \"When was your last menstrual period?\" \"Did you deliver in the last 2 weeks?\"      na  10. HIGH RISK: \"Do you have any heart or lung problems?\" \"Do you have a weak immune system?\" (e.g., heart failure, COPD, asthma, HIV positive, chemotherapy, renal failure, diabetes mellitus, sickle cell anemia, obesity)       no  11. TRAVEL: \"Have you traveled out of the country recently?\" If Yes, ask: \"When and where?\" Also ask about out-of-state travel, since the [facility] has identified some high-risk cities for community spread in the . Note: Travel becomes less relevant if there is widespread community transmission where the patient lives.       no    Protocols used: CORONAVIRUS (COVID-19) EXPOSURE-ADULT-AH      "

## 2022-01-24 ENCOUNTER — TELEPHONE (OUTPATIENT)
Dept: NEUROLOGY | Facility: CLINIC | Age: 42
End: 2022-01-24

## 2022-01-24 DIAGNOSIS — G40.309 GENERALIZED SEIZURE DISORDER: ICD-10-CM

## 2022-01-24 RX ORDER — LEVETIRACETAM 500 MG/1
1500 TABLET, EXTENDED RELEASE ORAL 2 TIMES DAILY
Qty: 180 TABLET | Refills: 11 | Status: SHIPPED | OUTPATIENT
Start: 2022-01-24 | End: 2023-02-20

## 2022-01-24 NOTE — TELEPHONE ENCOUNTER
Caller: Shaheen Lee    Relationship: Self    Best call back number:[phone]    What medications are you currently taking:   Current Outpatient Medications on File Prior to Visit   Medication Sig Dispense Refill   • buprenorphine-naloxone (SUBOXONE) 8-2 MG per SL tablet Place 1 tablet under the tongue 2 (Two) Times a Day.     • clonazePAM (KlonoPIN) 1 MG tablet TAKE ONE TABLET BY MOUTH TWO TIMES A DAY FOR CHRONIC ANXIETY. (FILL WHEN FINISH WITH DIAZEPAM)     • fluticasone (FLONASE) 50 MCG/ACT nasal spray Daily As Needed.     • levETIRAcetam XR (KEPPRA XR) 500 MG 24 hr tablet Take 3 tablets by mouth 2 (Two) Times a Day. 180 tablet 11   • loratadine (CLARITIN) 10 MG tablet Take 1 tablet by mouth Daily. 30 tablet 0   • methocarbamol (ROBAXIN) 750 MG tablet TAKE ONE TABLET BY MOUTH FOUR TIMES DAILY AS NEEDED FOR MUSCLE SPAMS  0   • polyethylene glycol (MIRALAX) packet Take 17 g by mouth Daily. 12 each 0   • promethazine (PHENERGAN) 25 MG tablet Take 1 tablet by mouth Every 6 (Six) Hours As Needed for Nausea or Vomiting. 12 tablet 0   • triamcinolone (KENALOG) 0.1 % cream Daily As Needed.     • VENTOLIN  (90 Base) MCG/ACT inhaler Every 6 (Six) Hours As Needed.       No current facility-administered medications on file prior to visit.          When did you start taking these medications: NA    Which medication are you concerned about: KEPPRA    Who prescribed you this medication: MEEK ANTOINE    What are your concerns: PATIENT PREVIOUS MEEK FRIEND PATIENT. SCHEDULED TO SEE ESHA CANTRELL IN MAY. HE STATES [facility] IS NEEDING PRE AUTH FOR KEPPRA. HE STATES HE HAS ENOUGH TIL Friday. PLEASE ADVISE.     How long have you had these concerns: NA

## 2022-01-24 NOTE — TELEPHONE ENCOUNTER
PA approved. Refill sent to pt's pharmacy. Unable to call patient and notify. Number in chart does not work.

## 2022-02-11 ENCOUNTER — TELEPHONE (OUTPATIENT)
Dept: NEUROLOGY | Facility: CLINIC | Age: 42
End: 2022-02-11

## 2022-02-11 NOTE — TELEPHONE ENCOUNTER
Provider: HARISH PAULINO    Caller: MAURO MEANS    Relationship to Patient: SELF    Pharmacy: [facility] - [place], [place] - [address] - [phone] [facility] [phone] FX    Phone Number: [phone]    When was the patient last seen: 5/21/21    Reason for Call: PT CALLED REQUESTING SOONER APPT THAN F/U VISIT W/ HARISH PAULINO ON 5/23/22. PT STATES HE WOULD LIKE TO FURTHER DISCUSS CONCERNS HE BELIEVES ARE RELATED TO HIS SEIZURE DISORDER. PT STATES AT NIGHT TIME, WHILE SLEEPING, HE IS HAVING VERY VIVID DREAMS. UPON WAKING UP, PT REPORTS FELLING VERY NAUSEOUS, ANXIOUS, AND FEELS AURA SIMILARLY TO HOW HE FEELS BEFORE HAVING A SEIZURE.    PT DENIES HAVING ANY SEIZURES THAT HE IS AWARE OF. PT STATES NO TONGUE BITE & NO LOSS OF CONSCIOUSNESS.    What therapies/medications have you tried: NO MEDICATIONS CHANGES SINCE PT WAS LAST SEEN IN OFFICE. PT DOES REPORT THAT HE ACCIDENTALLY THREW AWAY HIS ANXIETY MEDICATION; HAS NOT HAD IN APPROXIMATELY IN 6 DAYS. PLANS TO HAVE REFILLED AT F/U VISIT W/ PCP ON 2/28/22.     PT WOULD LIKE TO DISCUSS SCHEDULING F/U VISIT W/ DR. OJEDA IF POSSIBLE. PT HAS SEEN HARISH TRUJILLO & MEEK ANTOINE PA-C & HAS F/U SCHEDULED W/ HARISH PAULINO SCHEDULED FOR 5/23/22.    PLEASE REVIEW AND ADVISE.

## 2022-10-10 ENCOUNTER — NURSE TRIAGE (OUTPATIENT)
Dept: CALL CENTER | Facility: HOSPITAL | Age: 42
End: 2022-10-10

## 2022-10-10 NOTE — TELEPHONE ENCOUNTER
Has had some congestion and cough for week or so, was wondering what to do. Triage done. And he was wanting to be seen, told him to call PCP or go to walk in clinic at  if wanted. To start humidifier at night and do nasal washings and gargle with warm salt water .Be seen in ER if any chest pain or severe difficulty breathing.      Reason for Disposition  • Cough with cold symptoms (e.g., runny nose, postnasal drip, throat clearing)    Additional Information  • Negative: SEVERE difficulty breathing (e.g., struggling for each breath, speaks in single words)  • Negative: Bluish (or gray) lips or face now  • Negative: [1] Difficulty breathing AND [2] exposure to flames, smoke, or fumes  • Negative: [1] Stridor AND [2] difficulty breathing  • Negative: Sounds like a life-threatening emergency to the triager  • Negative: [1] Previous asthma attacks AND [2] this feels like asthma attack  • Negative: Dry cough (non-productive;  no sputum or minimal clear sputum)  • Negative: [1] MODERATE difficulty breathing (e.g., speaks in phrases, SOB even at rest, pulse 100-120) AND [2] still present when not coughing  • Negative: Chest pain  (Exception: MILD central chest pain, present only when coughing)  • Negative: Patient sounds very sick or weak to the triager  • Negative: [1] MILD difficulty breathing (e.g., minimal/no SOB at rest, SOB with walking, pulse <100) AND [2] still present when not coughing  • Negative: [1] Coughed up blood AND [2] > 1 tablespoon (15 ml) (Exception: blood-tinged sputum)  • Negative: Fever > 103 F (39.4 C)  • Negative: [1] Fever > 101 F (38.3 C) AND [2] age > 60 years  • Negative: [1] Fever > 100.0 F (37.8 C) AND [2] bedridden (e.g., nursing home patient, CVA, chronic illness, recovering from surgery)  • Negative: [1] Fever > 100.0 F (37.8 C) AND [2] diabetes mellitus or weak immune system (e.g., HIV positive, cancer chemo, splenectomy, organ transplant, chronic steroids)  • Negative: Wheezing is  "present  • Negative: SEVERE coughing spells (e.g., whooping sound after coughing, vomiting after coughing)  • Negative: [1] Continuous (nonstop) coughing interferes with work or school AND [2] no improvement using cough treatment per Care Advice  • Negative: Coughing up traci-colored (reddish-brown) sputum  • Negative: Fever present > 3 days (72 hours)  • Negative: [1] Fever returns after gone for over 24 hours AND [2] symptoms worse or not improved  • Negative: [1] Using nasal washes and pain medicine > 24 hours AND [2] sinus pain (around cheekbone or eye) persists  • Negative: Earache  • Negative: [1] Known COPD or other severe lung disease (i.e., bronchiectasis, cystic fibrosis, lung surgery) AND [2] worsening symptoms (i.e., increased sputum purulence or amount, increased breathing difficulty  • Negative: Cough has been present for > 3 weeks  • Negative: [1] Nasal discharge AND [2] present > 10 days  • Negative: [1] Coughed up blood-tinged sputum AND [2] more than once  • Negative: Exposure to TB (Tuberculosis)  • Negative: Cough    Answer Assessment - Initial Assessment Questions  1. ONSET: \"When did the cough begin?\"       Started week ago   2. SEVERITY: \"How bad is the cough today?\"      mild  3. SPUTUM: \"Describe the color of your sputum\" (none, dry cough; clear, white, yellow, green)      clear  4. HEMOPTYSIS: \"Are you coughing up any blood?\" If so ask: \"How much?\" (flecks, streaks, tablespoons, etc.)      no  5. DIFFICULTY BREATHING: \"Are you having difficulty breathing?\" If Yes, ask: \"How bad is it?\" (e.g., mild, moderate, severe)     - MILD: No SOB at rest, mild SOB with walking, speaks normally in sentences, can lay down, no retractions, pulse < 100.     - MODERATE: SOB at rest, SOB with minimal exertion and prefers to sit, cannot lie down flat, speaks in phrases, mild retractions, audible wheezing, pulse 100-120.     - SEVERE: Very SOB at rest, speaks in single words, struggling to breathe, sitting " "hunched forward, retractions, pulse > 120       Slight sob with anxiety  6. FEVER: \"Do you have a fever?\" If Yes, ask: \"What is your temperature, how was it measured, and when did it start?\"      no  7. CARDIAC HISTORY: \"Do you have any history of heart disease?\" (e.g., heart attack, congestive heart failure)       no  8. LUNG HISTORY: \"Do you have any history of lung disease?\"  (e.g., pulmonary embolus, asthma, emphysema)      asthma  9. PE RISK FACTORS: \"Do you have a history of blood clots?\" (or: recent major surgery, recent prolonged travel, bedridden)      no  10. OTHER SYMPTOMS: \"Do you have any other symptoms?\" (e.g., runny nose, wheezing, chest pain)        Runny nose, anxiety, cough  11. PREGNANCY: \"Is there any chance you are pregnant?\" \"When was your last menstrual period?\"        no  12. TRAVEL: \"Have you traveled out of the country in the last month?\" (e.g., travel history, exposures)       n o    Protocols used: COUGH - ACUTE PRODUCTIVE-ADULT-AH      "

## 2023-02-20 DIAGNOSIS — G40.309 GENERALIZED SEIZURE DISORDER: ICD-10-CM

## 2023-02-20 RX ORDER — LEVETIRACETAM 500 MG/1
1500 TABLET, EXTENDED RELEASE ORAL 2 TIMES DAILY
Qty: 180 TABLET | Refills: 1 | Status: SHIPPED | OUTPATIENT
Start: 2023-02-20

## 2023-03-06 ENCOUNTER — NURSE TRIAGE (OUTPATIENT)
Dept: CALL CENTER | Facility: HOSPITAL | Age: 43
End: 2023-03-06
Payer: COMMERCIAL

## 2023-03-06 NOTE — TELEPHONE ENCOUNTER
Reason for Disposition  • Problems with anxiety or stress    Additional Information  • Negative: Passed out (i.e., lost consciousness, collapsed and was not responding)  • Negative: Shock suspected (e.g., cold/pale/clammy skin, too weak to stand, low BP, rapid pulse)  • Negative: Difficult to awaken or acting confused (e.g., disoriented, slurred speech)  • Negative: Visible sweat on face or sweat dripping down face  • Negative: Unable to walk, or can only walk with assistance (e.g., requires support)  • Negative: [1] Received SHOCK from implantable cardiac defibrillator AND [2] persisting symptoms (i.e., palpitations, lightheadedness)  • Negative: [1] Dizziness, lightheadedness, or weakness AND [2] heart beating very rapidly (e.g., > 140 / minute)  • Negative: [1] Dizziness, lightheadedness, or weakness AND [2] heart beating very slowly  (e.g., < 50 / minute)  • Negative: Sounds like a life-threatening emergency to the triager  • Negative: Chest pain  • Negative: Implantable Cardiac Defibrillator (ICD) or a pacemaker symptoms or questions  • Negative: Difficulty breathing  • Negative: Dizziness, lightheadedness, or weakness  • Negative: [1] Heart beating very rapidly (e.g., > 140 / minute) AND [2] present now  (Exception: during exercise)  • Negative: Heart beating very slowly (e.g., < 50 / minute)  (Exception: athlete and heart rate normal for caller)  • Negative: New or worsened shortness of breath with activity (dyspnea on exertion)  • Negative: Patient sounds very sick or weak to the triager  • Negative: [1] Heart beating very rapidly (e.g., > 140 / minute) AND [2] not present now  (Exception: during exercise)  • Negative: [1] Skipped or extra beat(s) AND [2] increases with exercise or exertion  • Negative: [1] Skipped or extra beat(s) AND [2] occurs 4 or more times per minute  • Negative: New or worsened ankle swelling  • Negative: History of heart disease  (i.e., heart attack, bypass surgery, angina,  "angioplasty, CHF) (Exception: brief heartbeat symptoms that went away and now feels well)  • Negative: Age > 60 years (Exception: brief heartbeat symptoms that went away and now feels well)  • Negative: Taking water pill (i.e., diuretic) or heart medication (e.g., digoxin)  • Negative: Wearing a \"Holter monitor\" or \"cardiac event monitor\"  • Negative: [1] Received SHOCK from implantable cardiac defibrillator AND [2] now feels well  • Negative: Heart rhythm alert (e.g., \"you have irregular heartbeat\") from personal wearable device (e.g., Apple Watch)  • Negative: History of hyperthyroidism or taking thyroid medication  • Negative: Substance use (drug use) or misuse, known or suspected  • Negative: [1] ADHD AND [2] taking stimulant medication  • Negative: [1] Palpitations AND [2] no improvement after using CARE ADVICE    Answer Assessment - Initial Assessment Questions  1. DESCRIPTION: \"Please describe your heart rate or heartbeat that you are having\" (e.g., fast/slow, regular/irregular, skipped or extra beats, \"palpitations\")      Palpitations, extra beats in chest per patient, fast heart rate   2. ONSET: \"When did it start?\" (Minutes, hours or days)       Off and on for about a week or so  3. DURATION: \"How long does it last\" (e.g., seconds, minutes, hours)      A few minutes  4. PATTERN \"Does it come and go, or has it been constant since it started?\"  \"Does it get worse with exertion?\"   \"Are you feeling it now?\"      Not feeling it now; happens when he is relaxes  5. TAP: \"Using your hand, can you tap out what you are feeling on a chair or table in front of you, so that I can hear?\" (Note: not all patients can do this)        n/a  6. HEART RATE: \"Can you tell me your heart rate?\" \"How many beats in 15 seconds?\"  (Note: not all patients can do this)        Not able to tell me hr at this time.  7. RECURRENT SYMPTOM: \"Have you ever had this before?\" If Yes, ask: \"When was the last time?\" and \"What happened that " "time?\"       Yes happened today  8. CAUSE: \"What do you think is causing the palpitations?\"      Not sure   9. CARDIAC HISTORY: \"Do you have any history of heart disease?\" (e.g., heart attack, angina, bypass surgery, angioplasty, arrhythmia)       yes  10. OTHER SYMPTOMS: \"Do you have any other symptoms?\" (e.g., dizziness, chest pain, sweating, difficulty breathing)       none  11. PREGNANCY: \"Is there any chance you are pregnant?\" \"When was your last menstrual period?\"        n/a    Protocols used: HEART RATE AND HEARTBEAT QUESTIONS-ADULT-AH    "

## 2023-03-06 NOTE — TELEPHONE ENCOUNTER
Caller concerned about having irregular heart beat, palpitations that is intermittent and he is not feeling this way at this time he states.  He was relaxing watching TV at home, felt like his heart was racing, stronger heart rate noted at this time.  Pt has been under a lot of stress, anxiety at times lately and has a lot of issues/injuries in the past per patient.  No chest pain, dizziness or shortness of breath noted at this time.  No lightheadedness noted.  Pt states no slurred speech or weakness.  Pt able to walk and do ADLs on his own.  He has medication for anxiety but does not like to take it.  Reviewed guidelines and protocols with patient and advised him to see his PCP and let him know of his concerns. He states he has an appt on Mar 12 or 13 and I suggested calling PCP office today to see if he could be seen sooner.  He verbalized understanding.  Advised that if he started having chest pain, shortness of air or any resp distress or pain to go to the ED and have someone else drive him.  Advised to call back with any questions or concerns.  Pt voiced understanding.

## 2023-05-22 ENCOUNTER — TELEPHONE (OUTPATIENT)
Dept: NEUROLOGY | Facility: CLINIC | Age: 43
End: 2023-05-22
Payer: COMMERCIAL

## 2023-05-23 ENCOUNTER — OFFICE VISIT (OUTPATIENT)
Dept: NEUROLOGY | Facility: CLINIC | Age: 43
End: 2023-05-23
Payer: COMMERCIAL

## 2023-05-23 VITALS
DIASTOLIC BLOOD PRESSURE: 78 MMHG | OXYGEN SATURATION: 96 % | BODY MASS INDEX: 36.94 KG/M2 | SYSTOLIC BLOOD PRESSURE: 124 MMHG | HEART RATE: 89 BPM | HEIGHT: 70 IN | WEIGHT: 258 LBS

## 2023-05-23 DIAGNOSIS — G40.309 GENERALIZED SEIZURE DISORDER: ICD-10-CM

## 2023-05-23 RX ORDER — LEVETIRACETAM 500 MG/1
1500 TABLET, EXTENDED RELEASE ORAL 2 TIMES DAILY
Qty: 180 TABLET | Refills: 1 | Status: SHIPPED | OUTPATIENT
Start: 2023-05-23

## 2023-05-23 NOTE — PROGRESS NOTES
Neurology Progress Note    Cheif Complaint:    Seizure    Subjective   History of Present Illness:  Shaheen Lee is a 42 y.o. male who presents today for seizure.  He is routinely followed by Shaheen Sawyer Jr., MD for primary care.     Seizure  He continues to deny any new seizures at this time.  He continues to report that he is taking Keppra 1500 mg twice daily without any missed doses or complaints.  He continues to follow seizure precautions without any concerns.  He does have a history of traumatic brain injury in the past as caused onset of seizures.    Allergies:    Amoxicillin, Codeine, Darvon [propoxyphene], Dilantin [phenytoin], Talwin [pentazocine], Toradol [ketorolac tromethamine], and Tramadol    Medications:  Current Outpatient Medications   Medication Sig Dispense Refill    buprenorphine-naloxone (SUBOXONE) 8-2 MG film film 1 film.      buprenorphine-naloxone (SUBOXONE) 8-2 MG per SL tablet Place 1 tablet under the tongue 2 (Two) Times a Day.      cetirizine (zyrTEC) 10 MG tablet 0.5 tablets.      clonazePAM (KlonoPIN) 1 MG tablet TAKE ONE TABLET BY MOUTH TWO TIMES A DAY FOR CHRONIC ANXIETY. (FILL WHEN FINISH WITH DIAZEPAM)      ipratropium-albuterol (DUO-NEB) 0.5-2.5 mg/3 ml nebulizer 1.5 mL.      levETIRAcetam XR (KEPPRA XR) 500 MG 24 hr tablet Take 3 tablets by mouth 2 (Two) Times a Day. 180 tablet 1    methocarbamol (ROBAXIN) 750 MG tablet TAKE ONE TABLET BY MOUTH FOUR TIMES DAILY AS NEEDED FOR MUSCLE SPAMS  0    polyethylene glycol (MIRALAX) packet Take 17 g by mouth Daily. 12 each 0    triamcinolone (KENALOG) 0.1 % cream Daily As Needed.      VENTOLIN  (90 Base) MCG/ACT inhaler Every 6 (Six) Hours As Needed.       No current facility-administered medications for this visit.     Current outpatient and discharge medications have been reconciled for the patient.  Reviewed by: HARISH Cotter    Past Medical History:  Past Medical History:   Diagnosis Date    Anxiety  Please advise on pt message below.   "disorder     Asthma     PTSD (post-traumatic stress disorder)     Seizures     TBI (traumatic brain injury)     Thoracic injury     Thoracic vertebral fracture      Past Surgical History:   Procedure Laterality Date    CRANIOTOMY      CRANIOTOMY      ENDOSCOPY N/A 3/21/2020    Procedure: ESOPHAGOGASTRODUODENOSCOPY WITH ANESTHESIA;  Surgeon: Santino Patton MD;  Location: [facility] OR;  Service: Gastroenterology;  Laterality: N/A;  preop; foreign body in esophagus  postop; food bolus removal   PCP none     LUMBAR DISC SURGERY      SHOULDER SURGERY      THORACIC SPINE SURGERY       Family History   Problem Relation Age of Onset    COPD Mother     COPD Father      Social History     Tobacco Use    Smoking status: Never    Smokeless tobacco: Never   Vaping Use    Vaping Use: Never used   Substance Use Topics    Alcohol use: No    Drug use: No     Review of Systems   Neurological:  Negative for seizures.       Objective   Vital Signs:  Heart Rate:  [89] 89  BP: (124)/(78) 124/78      05/23/23  1426   Weight: 117 kg (258 lb)     177.8 cm (70\")  Body mass index is 37.02 kg/m².    Physical Exam  Vitals reviewed.   Constitutional:       Appearance: Normal appearance.   HENT:      Head: Normocephalic.      Mouth/Throat:      Pharynx: Oropharynx is clear.   Eyes:      General: Lids are normal.      Extraocular Movements: Extraocular movements intact.      Pupils: Pupils are equal, round, and reactive to light.   Cardiovascular:      Rate and Rhythm: Normal rate and regular rhythm.      Pulses: Normal pulses.   Pulmonary:      Effort: Pulmonary effort is normal.   Musculoskeletal:         General: Normal range of motion.      Cervical back: Normal range of motion and neck supple.   Skin:     General: Skin is warm and dry.      Capillary Refill: Capillary refill takes less than 2 seconds.   Neurological:      Motor: Motor strength is normal.      Coordination: Coordination is intact.      Deep Tendon Reflexes: Reflexes are " normal and symmetric.   Psychiatric:         Mood and Affect: Mood normal.         Speech: Speech normal.     Neurological Exam  Mental Status  Awake, alert and oriented to person, place and time. Recent and remote memory are intact. Speech is normal. Language is fluent with no aphasia. Attention and concentration are normal.    Cranial Nerves  CN II: Visual acuity is normal. Visual fields full to confrontation.  CN III, IV, VI: Extraocular movements intact bilaterally. Normal lids and orbits bilaterally. Pupils equal round and reactive to light bilaterally.  CN V: Facial sensation is normal.  CN VII: Full and symmetric facial movement.  CN IX, X: Palate elevates symmetrically. Normal gag reflex.  CN XI: Shoulder shrug strength is normal.  CN XII: Tongue midline without atrophy or fasciculations.    Motor   Strength is 5/5 throughout all four extremities.    Sensory  Sensation is intact to light touch, pinprick, vibration and proprioception in all four extremities.    Reflexes  Deep tendon reflexes are 2+ and symmetric in all four extremities.    Coordination    Finger-to-nose, rapid alternating movements and heel-to-shin normal bilaterally without dysmetria.    Gait  Normal casual, toe, heel and tandem gait.    Results Review:    Lab Results   Component Value Date    GLUCOSE 133 (H) 11/25/2019    BUN 13 11/25/2019    CREATININE 0.82 11/25/2019    EGFRIFNONA 104 11/25/2019    BCR 15.9 11/25/2019    K 4.0 11/25/2019    CO2 28.0 11/25/2019    CALCIUM 8.8 11/25/2019    ALBUMIN 4.50 11/25/2019    AST 24 11/25/2019    ALT 40 11/25/2019     Lab Results   Component Value Date    WBC 8.06 11/25/2019    HGB 14.7 11/25/2019    HCT 40.9 11/25/2019    MCV 78.2 (L) 11/25/2019     (L) 11/25/2019     No results found for: CHOL, CHLPL, TRIG, HDL, LDL, LDLDIRECT  Lab Results   Component Value Date    TSH 1.17 05/08/2015     No results found for: HGBA1C  Lab Results   Component Value Date    FOLATE >20.00 04/04/2019     Lab  Results   Component Value Date    MAWNSUAB51 854 04/04/2019        Plan .  Impression:  Shaheen Lee is a 42 y.o. male who presents for seizures.  He remained stable without any reports of any onset of seizure.  He continues Keppra XR 1500 mg twice daily.  We will send in prescription for refill of this.  He should continue with medication compliance and seizure precautions at this time.  He is aware and understanding today.    Plan:  Continue Keppra XR 1500 mg twice daily  Seizure precautions  Call with any breakthrough seizure.    The patient and I have discussed the plan of care and he is in full agreement at this time.     Follow-Up:  Return in about 1 year (around 5/23/2024) for Seizure.         Davon Sage, HARISH  05/23/23  16:47 CDT

## 2023-07-25 ENCOUNTER — TELEPHONE (OUTPATIENT)
Dept: NEUROLOGY | Facility: CLINIC | Age: 43
End: 2023-07-25
Payer: COMMERCIAL

## 2023-07-25 NOTE — TELEPHONE ENCOUNTER
CALLED PATIENT TO LET HIM KNOW THE EARLIEST APPOINTMENT I HAVE IS FOR OCTOBER 5TH AT 9AM. HE STATED THAT WOULD BE FINE.

## 2023-08-08 DIAGNOSIS — G40.309 GENERALIZED SEIZURE DISORDER: ICD-10-CM

## 2023-08-08 RX ORDER — LEVETIRACETAM 500 MG/1
1500 TABLET, EXTENDED RELEASE ORAL 2 TIMES DAILY
Qty: 180 TABLET | Refills: 1 | Status: SHIPPED | OUTPATIENT
Start: 2023-08-08

## 2023-09-25 ENCOUNTER — HOSPITAL ENCOUNTER (EMERGENCY)
Facility: HOSPITAL | Age: 43
Discharge: HOME | End: 2023-09-25
Attending: EMERGENCY MEDICINE | Admitting: EMERGENCY MEDICINE
Payer: COMMERCIAL

## 2023-09-25 VITALS
DIASTOLIC BLOOD PRESSURE: 73 MMHG | HEART RATE: 88 BPM | BODY MASS INDEX: 36.65 KG/M2 | TEMPERATURE: 98.5 F | WEIGHT: 256 LBS | SYSTOLIC BLOOD PRESSURE: 140 MMHG | HEIGHT: 70 IN | OXYGEN SATURATION: 96 % | RESPIRATION RATE: 14 BRPM

## 2023-09-25 DIAGNOSIS — R21 ACUTE MACULOPAPULAR RASH: Primary | ICD-10-CM

## 2023-09-25 PROCEDURE — 99282 EMERGENCY DEPT VISIT SF MDM: CPT

## 2023-09-25 RX ORDER — PREDNISONE 20 MG/1
60 TABLET ORAL DAILY
Qty: 15 TABLET | Refills: 0 | Status: SHIPPED | OUTPATIENT
Start: 2023-09-25 | End: 2023-09-30

## 2023-09-26 NOTE — ED PROVIDER NOTES
Subjective   History of Present Illness  Pt presents to the  with report of itching/rash off and on for months.  No known new exposures.  No n/v/f/c. No trouble breathing.  Has tried topical triamcinolone.       Review of Systems   Respiratory:  Negative for shortness of breath.    Cardiovascular:  Negative for chest pain.   Skin:  Positive for rash.   All other systems reviewed and are negative.    Past Medical History:   Diagnosis Date    Anxiety disorder     Asthma     PTSD (post-traumatic stress disorder)     Seizures     TBI (traumatic brain injury)     Thoracic injury     Thoracic vertebral fracture        Allergies   Allergen Reactions    Amoxicillin Rash    Codeine Rash    Darvon [Propoxyphene] Rash    Dilantin [Phenytoin] Rash    Talwin [Pentazocine] Rash    Toradol [Ketorolac Tromethamine] Rash    Tramadol Rash       Past Surgical History:   Procedure Laterality Date    CRANIOTOMY      CRANIOTOMY      ENDOSCOPY N/A 3/21/2020    Procedure: ESOPHAGOGASTRODUODENOSCOPY WITH ANESTHESIA;  Surgeon: Santino Patton MD;  Location: [facility] OR;  Service: Gastroenterology;  Laterality: N/A;  preop; foreign body in esophagus  postop; food bolus removal   PCP none     LUMBAR DISC SURGERY      SHOULDER SURGERY      THORACIC SPINE SURGERY         Family History   Problem Relation Age of Onset    COPD Mother     COPD Father        Social History     Socioeconomic History    Marital status:    Tobacco Use    Smoking status: Never    Smokeless tobacco: Never   Vaping Use    Vaping Use: Never used   Substance and Sexual Activity    Alcohol use: No    Drug use: No    Sexual activity: Defer           Objective   Physical Exam  Vitals and nursing note reviewed.   Constitutional:       General: He is not in acute distress.     Appearance: Normal appearance.   HENT:      Head: Normocephalic.   Cardiovascular:      Rate and Rhythm: Normal rate and regular rhythm.      Pulses: Normal pulses.      Heart sounds: Normal  heart sounds.   Pulmonary:      Effort: Pulmonary effort is normal.      Breath sounds: Normal breath sounds.   Abdominal:      General: Abdomen is flat.      Palpations: Abdomen is soft.   Skin:     Comments: Small area of erythema on R flank.  No crusting/drainage.     Neurological:      Mental Status: He is alert.       Procedures           ED Course                                           Medical Decision Making  Pt stable in EC - no evid of anaphylaxis/infection.  + dermatitis.  Given that he has ongoing sx after trying topical will give rx for prednisone.  Recommend PCM f/u for dermatology referral.        Final diagnoses:   Acute maculopapular rash       ED Disposition  ED Disposition       ED Disposition   Discharge    Condition   Stable    Comment   --               Shaheen Sawyer Jr., MD  [address] [facility] [address]  [phone]               Medication List        New Prescriptions      predniSONE 20 MG tablet  Commonly known as: DELTASONE  Take 3 tablets by mouth Daily for 5 days.               Where to Get Your Medications        These medications were sent to [facility] - [place], [place] - [address] - [phone]  - [phone] FX  [address], [facility] [address]      Phone: [phone]   predniSONE 20 MG tablet            Roni Oliveira,   09/25/23 8830

## 2023-12-13 ENCOUNTER — TELEPHONE (OUTPATIENT)
Dept: NEUROLOGY | Facility: CLINIC | Age: 43
End: 2023-12-13
Payer: COMMERCIAL

## 2023-12-13 NOTE — TELEPHONE ENCOUNTER
CALLED PATIENT TO GET MORE INFORMATION ON HIS PHONE CALL IN AND HE STATED HE HAD CONCERNS ABOUT HIS MEDICATION AND WANTED A SOONER APPOINTMENT TO TALK ABOUT THEM. I MOVED PATIENTS APPOINTMENT TO 2/1/24 @ 1PM. HE IS AWARE OF NEW DATE AND TIME.

## 2023-12-13 NOTE — TELEPHONE ENCOUNTER
Caller: Shaheen Lee    Relationship: Self    Best call back number: [phone]     What is the best time to reach you: ANY    Who are you requesting to speak with (clinical staff, provider,  specific staff member): TAMIA    What was the call regarding: PATIENT IS REQUESTING PRIOR AUTH FOR LEVETIRACETAM XR (KEPPRA) 500 MG 24 HR TABLE.    PLEASE INITIATE OR CALL TO ADVISE-THANK YOU    HE ADVISES THE KEPPRA MAYBE AFFECTING HIM AS HE HAS DEVELOPED RASHES & ITCHING. HE WOULD LIKE TO SPEAK TO PROVIDER REGARDING THIS MEDICINE & ANYTHING ELSE PERTAINING TO LIFE THREATING ISSUES FROM THIS MEDICINE. HE STATES HE RECEIVED INFO FROM FDA & HAS CONCERNS.    HE IS WANTING A REFERRAL FOR U/S AS WELL.    PLEASE REVIEW & CALL TO ADVISE-THANK YOU

## 2024-02-01 ENCOUNTER — TELEPHONE (OUTPATIENT)
Dept: NEUROLOGY | Facility: CLINIC | Age: 44
End: 2024-02-01

## 2024-02-01 NOTE — TELEPHONE ENCOUNTER
----- Message from Christiane Villagomez sent at 2/1/2024  2:10 PM CST -----  Regarding: Patient requesting a call  Patient was 20 min late to his appt so I r/s him. He is wanting to talk to you about a medication that he is on and would like a call back.     Thanks

## 2024-02-01 NOTE — TELEPHONE ENCOUNTER
CALLED PATIENT TO SEE WHAT KIND OF CONCERNS HE HAD ABOUT MEDICATION. SAID THAT HIS CONCERNS IS ABOUT KEPPRA AND THAT IT CAN CAUSE LIVER DAMAGE AND IS WANTING TO KNOW IF HE NEEDED AN US OF LIVER. SAID WE DID NOT TELL HIM ABOUT ANY SIDE EFFECTS. HE DID MENTION IT HAS BEEN HELPING OR HAS AT LEAST NOT BEEN HAVING ANY SEIZURE ACTIVITY. SAID THERE WAS AN FDA WARNING THAT CAME OUT AND HAD READ ABOUT IT LOTS OF PROBLEMS CAUSE BY THE MEDICATION. HE JUST WANTED TO MAKE SURE IT WOULD NOT INVOLVE HIM AT ANY TIME. I TOLD HIM I HAVE NOT HEARD OF ANY PROBLEMS AND WE HAVE PLENTY OF PATIENTS ON THE SAME MEDICATION BUT I WOULD TALK WITH [employer] AND GET BACK WITH HIM OR HE CAN TALK MORE WITH HIM AT HIS NEXT VISIT.

## 2024-03-03 ENCOUNTER — NURSE TRIAGE (OUTPATIENT)
Dept: CALL CENTER | Facility: HOSPITAL | Age: 44
End: 2024-03-03
Payer: COMMERCIAL

## 2024-03-03 NOTE — TELEPHONE ENCOUNTER
"For the last 3 days his urine is foul, smells like fish.   Reason for Disposition   Bad or foul-smelling urine    Additional Information   Negative: Shock suspected (e.g., cold/pale/clammy skin, too weak to stand, low BP, rapid pulse)   Negative: Sounds like a life-threatening emergency to the triager   Negative: Followed a female genital area injury (e.g., labia, vagina, vulva)   Negative: Followed a male genital area injury (e.g., penis, scrotum)   Negative: Vaginal discharge   Negative: Pus (white, yellow) or bloody discharge from end of penis   Negative: [1] Taking antibiotic for urinary tract infection (UTI) AND [2] female   Negative: [1] Taking antibiotic for urinary tract infection (UTI) AND [2] male   Negative: [1] Pain or burning with passing urine (urination) AND [2] pregnant   Negative: [1] Pain or burning with passing urine (urination) AND [2] postpartum (from 0 to 6 weeks after delivery)   Negative: [1] Pain or burning with passing urine (urination) AND [2] female   Negative: [1] Pain or burning with passing urine (urination) AND [2] male   Negative: Pain or itching in the vulvar area   Negative: Pain in scrotum is main symptom   Negative: Blood in the urine is main symptom   Negative: Symptoms arising from use of a urinary catheter (e.g., Coude, Walden)   Negative: [1] Unable to urinate (or only a few drops) > 4 hours AND [2] bladder feels very full (e.g., palpable bladder or strong urge to urinate)   Negative: [1] Decreased urination and [2] drinking very little AND [2] dehydration suspected (e.g., dark urine, no urine > 12 hours, very dry mouth, very lightheaded)   Negative: Patient sounds very sick or weak to the triager   Negative: Fever > 100.4 F (38.0 C)   Negative: Side (flank) or lower back pain present   Negative: Urinating more frequently than usual (i.e., frequency)    Answer Assessment - Initial Assessment Questions  1. SYMPTOM: \"What's the main symptom you're concerned about?\" (e.g., " "frequency, incontinence)     Fould smelling urine   2. ONSET: \"When did the  *No Answer*  start?\"      3 days ago  3. PAIN: \"Is there any pain?\" If Yes, ask: \"How bad is it?\" (Scale: 1-10; mild, moderate, severe)      No pain   4. CAUSE: \"What do you think is causing the symptoms?\"     unkown  5. OTHER SYMPTOMS: \"Do you have any other symptoms?\" (e.g., blood in urine, fever, flank pain, pain with urination)      none  6. PREGNANCY: \"Is there any chance you are pregnant?\" \"When was your last menstrual period?\"      n    Protocols used: Urinary Symptoms-ADULT-AH    "

## 2024-04-29 ENCOUNTER — TELEPHONE (OUTPATIENT)
Dept: NEUROLOGY | Facility: CLINIC | Age: 44
End: 2024-04-29
Payer: COMMERCIAL

## 2024-04-29 NOTE — TELEPHONE ENCOUNTER
CALLED PATIENT TO LET HIM KNOW THAT I WAS WORKING ON THE PA AND I JUST NEEDED TO KNOW IF HE WAS TAKING BRAND NAME OR GENERIC AND HE SAID HIS BOTTLES SAY LEVETIRACETAM. HE ALSO VERIFIED HE HAS BEEN GETTING IT BECAUSE I NOTICED IN OUR SYSTEM THAT WE HADN'T FILLED SINCE AUGUST, HE SAID HE THOUGHT THAT HE HAD GOTTEN EXTRA DURING COVID AND HASN'T NEEDED TO FILL AS OFTEN. I TOLD HIM I WOULD GET THE PA SENT IN. HE VOICED UNDERSTANDING.

## 2024-04-29 NOTE — TELEPHONE ENCOUNTER
Caller: Shaheen Lee    Relationship: Self    Best call back number: [phone]     Which medication are you concerned about:   levETIRAcetam XR (KEPPRA XR) 500 MG 24 hr tablet     Who prescribed you this medication:   ESHA CANTRELL    When did you start taking this medication:   FOR A WHILE    What are your concerns:   PT'S PRIOR AUTH FOR THIS RX HAS .    [facility]

## 2024-07-03 ENCOUNTER — OFFICE VISIT (OUTPATIENT)
Dept: NEUROLOGY | Facility: CLINIC | Age: 44
End: 2024-07-03
Payer: COMMERCIAL

## 2024-07-03 VITALS
SYSTOLIC BLOOD PRESSURE: 155 MMHG | WEIGHT: 250 LBS | OXYGEN SATURATION: 96 % | BODY MASS INDEX: 35.79 KG/M2 | HEART RATE: 101 BPM | HEIGHT: 70 IN | DIASTOLIC BLOOD PRESSURE: 78 MMHG

## 2024-07-03 DIAGNOSIS — G40.309 GENERALIZED SEIZURE DISORDER: ICD-10-CM

## 2024-07-03 PROCEDURE — 99213 OFFICE O/P EST LOW 20 MIN: CPT | Performed by: NURSE PRACTITIONER

## 2024-07-03 PROCEDURE — 1159F MED LIST DOCD IN RCRD: CPT | Performed by: NURSE PRACTITIONER

## 2024-07-03 PROCEDURE — 1160F RVW MEDS BY RX/DR IN RCRD: CPT | Performed by: NURSE PRACTITIONER

## 2024-07-03 RX ORDER — LEVETIRACETAM 500 MG/1
1500 TABLET, FILM COATED, EXTENDED RELEASE ORAL 2 TIMES DAILY
Qty: 180 TABLET | Refills: 11 | Status: SHIPPED | OUTPATIENT
Start: 2024-07-03

## 2024-07-03 NOTE — ASSESSMENT & PLAN NOTE
HE is doing well without any seizure activity.  No changes recommended to care.  I continue to recommend Keppra 1500mg BID and Seizure precautions at this time.  I did discuss with him that he is good to work from a seizure standpoint.  He is understanding.

## 2024-07-03 NOTE — PROGRESS NOTES
Neurology Progress Note    Cheif Complaint:    Seizure    Subjective   History of Present Illness:  Shaheen Lee is a 44 y.o. male who presents today for seizure.  He is routinely followed by Shaheen Sawyer Jr., MD for primary care.     Seizure  He denies any recent seizure activity since last being seen.  He denies any other associated symptoms such migraines or any dizziness.  He continues to take Keppra 1500mg BID without any concerns.  He is hoping to start back to work soon.     Allergies:    Amoxicillin, Codeine, Darvon [propoxyphene], Dilantin [phenytoin], Talwin [pentazocine], Toradol [ketorolac tromethamine], and Tramadol    Medications:  Current Outpatient Medications   Medication Sig Dispense Refill    buprenorphine-naloxone (SUBOXONE) 8-2 MG film film 1 film.      buprenorphine-naloxone (SUBOXONE) 8-2 MG per SL tablet Place 1 tablet under the tongue 2 (Two) Times a Day.      cetirizine (zyrTEC) 10 MG tablet 0.5 tablets.      clonazePAM (KlonoPIN) 1 MG tablet TAKE ONE TABLET BY MOUTH TWO TIMES A DAY FOR CHRONIC ANXIETY. (FILL WHEN FINISH WITH DIAZEPAM)      fluticasone (FLONASE) 50 MCG/ACT nasal spray 2 sprays by Each Nare route Daily.      ipratropium-albuterol (DUO-NEB) 0.5-2.5 mg/3 ml nebulizer 1.5 mL.      levETIRAcetam XR (KEPPRA XR) 500 MG tablet Take 3 tablets by mouth 2 (Two) Times a Day. 180 tablet 11    polyethylene glycol (MIRALAX) packet Take 17 g by mouth Daily. 12 each 0    triamcinolone (KENALOG) 0.1 % cream Daily As Needed.      VENTOLIN  (90 Base) MCG/ACT inhaler Every 6 (Six) Hours As Needed.       No current facility-administered medications for this visit.     Current outpatient and discharge medications have been reconciled for the patient.  Reviewed by: HARISH Cotter    Past Medical History:  Past Medical History:   Diagnosis Date    Anxiety disorder     Asthma     PTSD (post-traumatic stress disorder)     Seizures     TBI (traumatic brain injury)      "Thoracic injury     Thoracic vertebral fracture      Past Surgical History:   Procedure Laterality Date    CRANIOTOMY      CRANIOTOMY      ENDOSCOPY N/A 3/21/2020    Procedure: ESOPHAGOGASTRODUODENOSCOPY WITH ANESTHESIA;  Surgeon: Santino Patton MD;  Location: [facility] OR;  Service: Gastroenterology;  Laterality: N/A;  preop; foreign body in esophagus  postop; food bolus removal   PCP none     LUMBAR DISC SURGERY      SHOULDER SURGERY      THORACIC SPINE SURGERY       Family History   Problem Relation Age of Onset    COPD Mother     COPD Father      Social History     Tobacco Use    Smoking status: Never    Smokeless tobacco: Never   Vaping Use    Vaping status: Never Used   Substance Use Topics    Alcohol use: No    Drug use: No     Review of Systems   Neurological:  Negative for seizures.         Objective   Vital Signs:  Heart Rate:  [101] 101  BP: (155)/(78) 155/78      07/03/24  1508   Weight: 113 kg (250 lb)     177.8 cm (70\")  Body mass index is 35.87 kg/m².    Neurological Exam  Mental Status  Awake, alert and oriented to person, place and time. Recent and remote memory are intact. Speech is normal. Language is fluent with no aphasia. Attention and concentration are normal.    Cranial Nerves  CN II: Visual acuity is normal. Visual fields full to confrontation.  CN III, IV, VI: Extraocular movements intact bilaterally. Normal lids and orbits bilaterally. Pupils equal round and reactive to light bilaterally.  CN V: Facial sensation is normal.  CN VII: Full and symmetric facial movement.  CN IX, X: Palate elevates symmetrically. Normal gag reflex.  CN XI: Shoulder shrug strength is normal.  CN XII: Tongue midline without atrophy or fasciculations.    Motor   Strength is 5/5 throughout all four extremities.    Sensory  Sensation is intact to light touch, pinprick, vibration and proprioception in all four extremities.    Reflexes  Deep tendon reflexes are 2+ and symmetric in all four " "extremities.    Coordination    Finger-to-nose, rapid alternating movements and heel-to-shin normal bilaterally without dysmetria.    Gait  Normal casual, toe, heel and tandem gait.      Results Review:    Lab Results   Component Value Date    GLUCOSE 133 (H) 11/25/2019    BUN 13 11/25/2019    CREATININE 0.82 11/25/2019    EGFRIFNONA 104 11/25/2019    BCR 15.9 11/25/2019    K 4.0 11/25/2019    CO2 28.0 11/25/2019    CALCIUM 8.8 11/25/2019    ALBUMIN 4.50 11/25/2019    AST 24 11/25/2019    ALT 40 11/25/2019     Lab Results   Component Value Date    WBC 8.06 11/25/2019    HGB 14.7 11/25/2019    HCT 40.9 11/25/2019    MCV 78.2 (L) 11/25/2019     (L) 11/25/2019     No results found for: \"CHOL\", \"CHLPL\", \"TRIG\", \"HDL\", \"LDL\", \"LDLDIRECT\"  Lab Results   Component Value Date    TSH 1.17 05/08/2015     No results found for: \"HGBA1C\"  Lab Results   Component Value Date    FOLATE >20.00 04/04/2019     Lab Results   Component Value Date    [id number] 04/04/2019        Plan   Diagnoses and all orders for this visit:    1. Generalized seizure disorder  Assessment & Plan:  HE is doing well without any seizure activity.  No changes recommended to care.  I continue to recommend Keppra 1500mg BID and Seizure precautions at this time.  I did discuss with him that he is good to work from a seizure standpoint.  He is understanding.     Orders:  -     levETIRAcetam XR (KEPPRA XR) 500 MG tablet; Take 3 tablets by mouth 2 (Two) Times a Day.  Dispense: 180 tablet; Refill: 11      Follow-Up:  Return in about 1 year (around 7/3/2025) for Seizure.         HARISH Cotter  07/03/24  15:23 CDT  "

## 2024-08-24 ENCOUNTER — APPOINTMENT (OUTPATIENT)
Dept: CT IMAGING | Facility: HOSPITAL | Age: 44
End: 2024-08-24
Payer: COMMERCIAL

## 2024-08-24 ENCOUNTER — HOSPITAL ENCOUNTER (EMERGENCY)
Facility: HOSPITAL | Age: 44
Discharge: HOME | End: 2024-08-24
Payer: COMMERCIAL

## 2024-08-24 ENCOUNTER — APPOINTMENT (OUTPATIENT)
Dept: GENERAL RADIOLOGY | Facility: HOSPITAL | Age: 44
End: 2024-08-24
Payer: COMMERCIAL

## 2024-08-24 VITALS
SYSTOLIC BLOOD PRESSURE: 139 MMHG | RESPIRATION RATE: 13 BRPM | OXYGEN SATURATION: 96 % | HEART RATE: 56 BPM | WEIGHT: 238 LBS | BODY MASS INDEX: 34.07 KG/M2 | DIASTOLIC BLOOD PRESSURE: 94 MMHG | HEIGHT: 70 IN | TEMPERATURE: 98 F

## 2024-08-24 DIAGNOSIS — R00.2 PALPITATIONS: Primary | ICD-10-CM

## 2024-08-24 LAB
ALBUMIN SERPL-MCNC: 4.6 G/DL (ref 3.5–5.2)
ALBUMIN/GLOB SERPL: 2 G/DL
ALP SERPL-CCNC: 82 U/L (ref 39–117)
ALT SERPL W P-5'-P-CCNC: 46 U/L (ref 1–41)
AMPHET+METHAMPHET UR QL: NEGATIVE
AMPHETAMINES UR QL: NEGATIVE
ANION GAP SERPL CALCULATED.3IONS-SCNC: 13 MMOL/L (ref 5–15)
AST SERPL-CCNC: 36 U/L (ref 1–40)
BARBITURATES UR QL SCN: NEGATIVE
BASOPHILS # BLD AUTO: 0.04 10*3/MM3 (ref 0–0.2)
BASOPHILS NFR BLD AUTO: 1 % (ref 0–1.5)
BENZODIAZ UR QL SCN: POSITIVE
BILIRUB SERPL-MCNC: 0.6 MG/DL (ref 0–1.2)
BUN SERPL-MCNC: 10 MG/DL (ref 6–20)
BUN/CREAT SERPL: 14.1 (ref 7–25)
BUPRENORPHINE SERPL-MCNC: POSITIVE NG/ML
CALCIUM SPEC-SCNC: 9.1 MG/DL (ref 8.6–10.5)
CANNABINOIDS SERPL QL: NEGATIVE
CHLORIDE SERPL-SCNC: 101 MMOL/L (ref 98–107)
CO2 SERPL-SCNC: 28 MMOL/L (ref 22–29)
COCAINE UR QL: NEGATIVE
CREAT SERPL-MCNC: 0.71 MG/DL (ref 0.76–1.27)
D DIMER PPP FEU-MCNC: 0.95 MCGFEU/ML (ref 0–0.5)
DEPRECATED RDW RBC AUTO: 35 FL (ref 37–54)
EGFRCR SERPLBLD CKD-EPI 2021: 114.6 ML/MIN/1.73
EOSINOPHIL # BLD AUTO: 0.09 10*3/MM3 (ref 0–0.4)
EOSINOPHIL NFR BLD AUTO: 2.2 % (ref 0.3–6.2)
ERYTHROCYTE [DISTWIDTH] IN BLOOD BY AUTOMATED COUNT: 12.3 % (ref 12.3–15.4)
FENTANYL UR-MCNC: NEGATIVE NG/ML
GLOBULIN UR ELPH-MCNC: 2.3 GM/DL
GLUCOSE SERPL-MCNC: 119 MG/DL (ref 65–99)
HCT VFR BLD AUTO: 39.9 % (ref 37.5–51)
HGB BLD-MCNC: 14.1 G/DL (ref 13–17.7)
IMM GRANULOCYTES # BLD AUTO: 0.01 10*3/MM3 (ref 0–0.05)
IMM GRANULOCYTES NFR BLD AUTO: 0.2 % (ref 0–0.5)
INR PPP: 1.09 (ref 0.91–1.09)
LYMPHOCYTES # BLD AUTO: 1.42 10*3/MM3 (ref 0.7–3.1)
LYMPHOCYTES NFR BLD AUTO: 35.1 % (ref 19.6–45.3)
MCH RBC QN AUTO: 28.5 PG (ref 26.6–33)
MCHC RBC AUTO-ENTMCNC: 35.3 G/DL (ref 31.5–35.7)
MCV RBC AUTO: 80.6 FL (ref 79–97)
METHADONE UR QL SCN: NEGATIVE
MONOCYTES # BLD AUTO: 0.26 10*3/MM3 (ref 0.1–0.9)
MONOCYTES NFR BLD AUTO: 6.4 % (ref 5–12)
NEUTROPHILS NFR BLD AUTO: 2.22 10*3/MM3 (ref 1.7–7)
NEUTROPHILS NFR BLD AUTO: 55.1 % (ref 42.7–76)
NRBC BLD AUTO-RTO: 0 /100 WBC (ref 0–0.2)
OPIATES UR QL: NEGATIVE
OXYCODONE UR QL SCN: NEGATIVE
PCP UR QL SCN: NEGATIVE
PLATELET # BLD AUTO: 121 10*3/MM3 (ref 140–450)
PMV BLD AUTO: 12.7 FL (ref 6–12)
POTASSIUM SERPL-SCNC: 4.1 MMOL/L (ref 3.5–5.2)
PROT SERPL-MCNC: 6.9 G/DL (ref 6–8.5)
PROTHROMBIN TIME: 14.6 SECONDS (ref 11.8–14.8)
QT INTERVAL: 394 MS
QT INTERVAL: 408 MS
QTC INTERVAL: 393 MS
QTC INTERVAL: 418 MS
RBC # BLD AUTO: 4.95 10*6/MM3 (ref 4.14–5.8)
SODIUM SERPL-SCNC: 142 MMOL/L (ref 136–145)
TRICYCLICS UR QL SCN: NEGATIVE
TROPONIN T SERPL HS-MCNC: <6 NG/L
TROPONIN T SERPL HS-MCNC: <6 NG/L
TSH SERPL DL<=0.05 MIU/L-ACNC: 1.18 UIU/ML (ref 0.27–4.2)
WBC NRBC COR # BLD AUTO: 4.04 10*3/MM3 (ref 3.4–10.8)

## 2024-08-24 PROCEDURE — 80307 DRUG TEST PRSMV CHEM ANLYZR: CPT | Performed by: NURSE PRACTITIONER

## 2024-08-24 PROCEDURE — 84484 ASSAY OF TROPONIN QUANT: CPT | Performed by: NURSE PRACTITIONER

## 2024-08-24 PROCEDURE — 71045 X-RAY EXAM CHEST 1 VIEW: CPT

## 2024-08-24 PROCEDURE — 85025 COMPLETE CBC W/AUTO DIFF WBC: CPT | Performed by: NURSE PRACTITIONER

## 2024-08-24 PROCEDURE — 85610 PROTHROMBIN TIME: CPT | Performed by: NURSE PRACTITIONER

## 2024-08-24 PROCEDURE — 93005 ELECTROCARDIOGRAM TRACING: CPT | Performed by: INTERNAL MEDICINE

## 2024-08-24 PROCEDURE — 85379 FIBRIN DEGRADATION QUANT: CPT | Performed by: NURSE PRACTITIONER

## 2024-08-24 PROCEDURE — 71275 CT ANGIOGRAPHY CHEST: CPT

## 2024-08-24 PROCEDURE — 84443 ASSAY THYROID STIM HORMONE: CPT | Performed by: NURSE PRACTITIONER

## 2024-08-24 PROCEDURE — 25510000001 IOPAMIDOL PER 1 ML: Performed by: NURSE PRACTITIONER

## 2024-08-24 PROCEDURE — 99285 EMERGENCY DEPT VISIT HI MDM: CPT

## 2024-08-24 PROCEDURE — 80053 COMPREHEN METABOLIC PANEL: CPT | Performed by: NURSE PRACTITIONER

## 2024-08-24 PROCEDURE — 93005 ELECTROCARDIOGRAM TRACING: CPT | Performed by: NURSE PRACTITIONER

## 2024-08-24 RX ORDER — IOPAMIDOL 755 MG/ML
100 INJECTION, SOLUTION INTRAVASCULAR
Status: COMPLETED | OUTPATIENT
Start: 2024-08-24 | End: 2024-08-24

## 2024-08-24 RX ORDER — SODIUM CHLORIDE 0.9 % (FLUSH) 0.9 %
10 SYRINGE (ML) INJECTION AS NEEDED
Status: DISCONTINUED | OUTPATIENT
Start: 2024-08-24 | End: 2024-08-24 | Stop reason: HOSPADM

## 2024-08-24 RX ADMIN — IOPAMIDOL 100 ML: 755 INJECTION, SOLUTION INTRAVENOUS at 15:44

## 2024-08-24 NOTE — ED PROVIDER NOTES
Subjective   History of Present Illness  Patient is a 44-year-old male presents the emergency department with palpitations, nausea, panicky feeling that started about 1230 yesterday.  He states that it got a little better throughout the night and then when he woke up again this morning he started having the same symptoms.  He states he felt like his heart was racing.  As of note he has been out of his Klonopin for the past 4 days that he takes for anxiety.  He denies any chest pain or shortness of breath.  He denies any cough or congestion.  No fever or chills.  Patient has a history of anxiety, asthma, PTSD, seizures, TBI.  He denies any illicit drug use.  He states he is prescribed Suboxone for opioid addiction.    History provided by:  Patient   used: No        Review of Systems   Constitutional: Negative.    HENT: Negative.     Eyes: Negative.    Respiratory: Negative.     Cardiovascular:         Patient is a 44-year-old male presents the emergency department with palpitations, nausea, panicky feeling that started about 1230 yesterday.  He states that it got a little better throughout the night and then when he woke up again this morning he started having the same symptoms.  He states he felt like his heart was racing.  As of note he has been out of his Klonopin for the past 4 days that he takes for anxiety.  He denies any chest pain or shortness of breath.  He denies any cough or congestion.  No fever or chills.  Patient has a history of anxiety, asthma, PTSD, seizures, TBI.  He denies any illicit drug use.  He states he is prescribed Suboxone for opioid addiction.     Gastrointestinal: Negative.    Endocrine: Negative.    Genitourinary: Negative.    Musculoskeletal: Negative.    Skin: Negative.    Allergic/Immunologic: Negative.    Neurological:  Positive for dizziness.   Hematological: Negative.    Psychiatric/Behavioral: Negative.     All other systems reviewed and are negative.      Past  Medical History:   Diagnosis Date    Anxiety disorder     Asthma     PTSD (post-traumatic stress disorder)     Seizures     TBI (traumatic brain injury)     Thoracic injury     Thoracic vertebral fracture        Allergies   Allergen Reactions    Amoxicillin Rash    Codeine Rash    Darvon [Propoxyphene] Rash    Dilantin [Phenytoin] Rash    Talwin [Pentazocine] Rash    Toradol [Ketorolac Tromethamine] Rash    Tramadol Rash       Past Surgical History:   Procedure Laterality Date    CRANIOTOMY      CRANIOTOMY      ENDOSCOPY N/A 3/21/2020    Procedure: ESOPHAGOGASTRODUODENOSCOPY WITH ANESTHESIA;  Surgeon: Santino Patton MD;  Location: [facility] OR;  Service: Gastroenterology;  Laterality: N/A;  preop; foreign body in esophagus  postop; food bolus removal   PCP none     LUMBAR DISC SURGERY      SHOULDER SURGERY      THORACIC SPINE SURGERY         Family History   Problem Relation Age of Onset    COPD Mother     COPD Father        Social History     Socioeconomic History    Marital status:    Tobacco Use    Smoking status: Never    Smokeless tobacco: Never   Vaping Use    Vaping status: Never Used   Substance and Sexual Activity    Alcohol use: No    Drug use: No    Sexual activity: Defer       Prior to Admission medications    Medication Sig Start Date End Date Taking? Authorizing Provider   buprenorphine-naloxone (SUBOXONE) 8-2 MG film film 1 film. 9/16/22   Dae Kirk MD   buprenorphine-naloxone (SUBOXONE) 8-2 MG per SL tablet Place 1 tablet under the tongue 2 (Two) Times a Day.    Dae Kirk MD   cetirizine (zyrTEC) 10 MG tablet 0.5 tablets. 9/24/22   Dae Kirk MD   clonazePAM (KlonoPIN) 1 MG tablet TAKE ONE TABLET BY MOUTH TWO TIMES A DAY FOR CHRONIC ANXIETY. (FILL WHEN FINISH WITH DIAZEPAM) 5/10/21   Dae Kirk MD   fluticasone (FLONASE) 50 MCG/ACT nasal spray 2 sprays by Each Nare route Daily. 7/18/23   Dae Kirk MD   ipratropium-albuterol (DUO-NEB)  "0.5-2.5 mg/3 ml nebulizer 1.5 mL. 8/25/22   ProviderDae MD   levETIRAcetam XR (KEPPRA XR) 500 MG tablet Take 3 tablets by mouth 2 (Two) Times a Day. 7/3/24   Davon Sage APRN   polyethylene glycol (MIRALAX) packet Take 17 g by mouth Daily. 11/25/19   Caitlin Packer APRN   triamcinolone (KENALOG) 0.1 % cream Daily As Needed. 4/17/20   Dae Kirk MD   VENTOLIN  (90 Base) MCG/ACT inhaler Every 6 (Six) Hours As Needed. 5/11/20   Dae Kirk MD       /94   Pulse 56   Temp 98 °F (36.7 °C) (Oral)   Resp 13   Ht 177.8 cm (70\")   Wt 108 kg (238 lb)   SpO2 96%   BMI 34.15 kg/m²     Objective   Physical Exam  Vitals and nursing note reviewed.   Constitutional:       Appearance: He is well-developed.      Comments: Non toxic appearing. No acute distress. Appears anxious    HENT:      Head: Normocephalic and atraumatic.   Eyes:      Conjunctiva/sclera: Conjunctivae normal.      Pupils: Pupils are equal, round, and reactive to light.   Cardiovascular:      Rate and Rhythm: Normal rate and regular rhythm.      Heart sounds: Normal heart sounds.   Pulmonary:      Effort: Pulmonary effort is normal.      Breath sounds: Normal breath sounds.   Abdominal:      General: Bowel sounds are normal.      Palpations: Abdomen is soft.   Musculoskeletal:         General: Normal range of motion.      Cervical back: Normal range of motion and neck supple.   Skin:     General: Skin is warm and dry.   Neurological:      Mental Status: He is alert and oriented to person, place, and time.      Deep Tendon Reflexes: Reflexes are normal and symmetric.   Psychiatric:         Behavior: Behavior normal.         Thought Content: Thought content normal.         Judgment: Judgment normal.         Procedures     Wells' Criteria for Pulmonary Embolism - MDCalc  Calculated on Aug 24 2024 4:25 PM  0.0 points -> Low risk group: 1.3% chance of PE in an ED population. Another study assigned scores ? 4 " as “PE Unlikely” and had a 3% incidence of PE.    Lab Results (last 24 hours)       Procedure Component Value Units Date/Time    CBC & Differential [[id number]]  (Abnormal) Collected: 08/24/24 1434    Specimen: Blood Updated: 08/24/24 1443    Narrative:      The following orders were created for panel order CBC & Differential.  Procedure                               Abnormality         Status                     ---------                               -----------         ------                     CBC Auto Differential[[id number]]        Abnormal            Final result                 Please view results for these tests on the individual orders.    Comprehensive Metabolic Panel [[id number]]  (Abnormal) Collected: 08/24/24 1434    Specimen: Blood Updated: 08/24/24 1503     Glucose 119 mg/dL      BUN 10 mg/dL      Creatinine 0.71 mg/dL      Sodium 142 mmol/L      Potassium 4.1 mmol/L      Chloride 101 mmol/L      CO2 28.0 mmol/L      Calcium 9.1 mg/dL      Total Protein 6.9 g/dL      Albumin 4.6 g/dL      ALT (SGPT) 46 U/L      AST (SGOT) 36 U/L      Alkaline Phosphatase 82 U/L      Total Bilirubin 0.6 mg/dL      Globulin 2.3 gm/dL      A/G Ratio 2.0 g/dL      BUN/Creatinine Ratio 14.1     Anion Gap 13.0 mmol/L      eGFR 114.6 mL/min/1.73     Narrative:      GFR Normal >60  Chronic Kidney Disease <60  Kidney Failure <15      Protime-INR [[id number]]  (Normal) Collected: 08/24/24 1434    Specimen: Blood Updated: 08/24/24 1453     Protime 14.6 Seconds      INR 1.09    TSH [[id number]]  (Normal) Collected: 08/24/24 1434    Specimen: Blood Updated: 08/24/24 1508     TSH 1.180 uIU/mL     D-dimer, Quantitative [[id number]]  (Abnormal) Collected: 08/24/24 1434    Specimen: Blood Updated: 08/24/24 1453     D-Dimer, Quantitative 0.95 MCGFEU/mL     Narrative:      According to the assay 's published package insert, a normal (<0.50 MCGFEU/mL) D-dimer result in conjunction with a non-high clinical probability assessment,  "excludes deep vein thrombosis (DVT) and pulmonary embolism (PE) with high sensitivity.    D-dimer values increase with age and this can make VTE exclusion of an older population difficult. To address this, the [employer], based on best available evidence and recent guidelines, recommends that clinicians use age-adjusted D-dimer thresholds in patients greater than 50 years of age with: a) a low probability of PE who do not meet all Pulmonary Embolism Rule Out Criteria, or b) in those with intermediate probability of PE.   The formula for an age-adjusted D-dimer cut-off is \"age/100\".  For example, a 60 year old patient would have an age-adjusted cut-off of 0.60 MCGFEU/mL and an 80 year old 0.80 MCGFEU/mL.    Single High Sensitivity Troponin T [[id number]]  (Normal) Collected: 08/24/24 1434    Specimen: Blood Updated: 08/24/24 1500     HS Troponin T <6 ng/L     Narrative:      High Sensitive Troponin T Reference Range:  <14.0 ng/L- Negative Female for AMI  <22.0 ng/L- Negative Male for AMI  >=14 - Abnormal Female indicating possible myocardial injury.  >=22 - Abnormal Male indicating possible myocardial injury.   Clinicians would have to utilize clinical acumen, EKG, Troponin, and serial changes to determine if it is an Acute Myocardial Infarction or myocardial injury due to an underlying chronic condition.         CBC Auto Differential [[id number]]  (Abnormal) Collected: 08/24/24 1434    Specimen: Blood Updated: 08/24/24 1443     WBC 4.04 10*3/mm3      RBC 4.95 10*6/mm3      Hemoglobin 14.1 g/dL      Hematocrit 39.9 %      MCV 80.6 fL      MCH 28.5 pg      MCHC 35.3 g/dL      RDW 12.3 %      RDW-SD 35.0 fl      MPV 12.7 fL      Platelets 121 10*3/mm3      Neutrophil % 55.1 %      Lymphocyte % 35.1 %      Monocyte % 6.4 %      Eosinophil % 2.2 %      Basophil % 1.0 %      Immature Grans % 0.2 %      Neutrophils, Absolute 2.22 10*3/mm3      Lymphocytes, Absolute 1.42 10*3/mm3      Monocytes, Absolute " 0.26 10*3/mm3      Eosinophils, Absolute 0.09 10*3/mm3      Basophils, Absolute 0.04 10*3/mm3      Immature Grans, Absolute 0.01 10*3/mm3      nRBC 0.0 /100 WBC     Urine Drug Screen - Urine, Clean Catch [[id number]]  (Abnormal) Collected: 08/24/24 1446    Specimen: Urine, Clean Catch Updated: 08/24/24 1505     THC, Screen, Urine Negative     Phencyclidine (PCP), Urine Negative     Cocaine Screen, Urine Negative     Methamphetamine, Ur Negative     Opiate Screen Negative     Amphetamine Screen, Urine Negative     Benzodiazepine Screen, Urine Positive     Tricyclic Antidepressants Screen Negative     Methadone Screen, Urine Negative     Barbiturates Screen, Urine Negative     Oxycodone Screen, Urine Negative     Buprenorphine, Screen, Urine Positive    Narrative:      Cutoff For Drugs Screened:    Amphetamines               500 ng/ml  Barbiturates               200 ng/ml  Benzodiazepines            150 ng/ml  Cocaine                    150 ng/ml  Methadone                  200 ng/ml  Opiates                    100 ng/ml  Phencyclidine               25 ng/ml  THC                         50 ng/ml  Methamphetamine            500 ng/ml  Tricyclic Antidepressants  300 ng/ml  Oxycodone                  100 ng/ml  Buprenorphine               10 ng/ml    The normal value for all drugs tested is negative. This report includes unconfirmed screening results, with the cutoff values listed, to be used for medical treatment purposes only.  Unconfirmed results must not be used for non-medical purposes such as employment or legal testing.  Clinical consideration should be applied to any drug of abuse test, particularly when unconfirmed results are used.      Fentanyl, Urine - Urine, Clean Catch [[id number]]  (Normal) Collected: 08/24/24 1446    Specimen: Urine, Clean Catch Updated: 08/24/24 1513     Fentanyl, Urine Negative    Narrative:      Negative Threshold:      Fentanyl 5 ng/mL     The normal value for the drug tested is  negative. This report includes final unconfirmed screening results to be used for medical treatment purposes only. Unconfirmed results must not be used for non-medical purposes such as employment or legal testing. Clinical consideration should be applied to any drug of abuse test, particularly when unconfirmed results are used.           Single High Sensitivity Troponin T [[id number]]  (Normal) Collected: 08/24/24 1657    Specimen: Blood Updated: 08/24/24 1734     HS Troponin T <6 ng/L     Narrative:      High Sensitive Troponin T Reference Range:  <14.0 ng/L- Negative Female for AMI  <22.0 ng/L- Negative Male for AMI  >=14 - Abnormal Female indicating possible myocardial injury.  >=22 - Abnormal Male indicating possible myocardial injury.   Clinicians would have to utilize clinical acumen, EKG, Troponin, and serial changes to determine if it is an Acute Myocardial Infarction or myocardial injury due to an underlying chronic condition.                 CT Angiogram Chest   Final Result      XR Chest 1 View   Final Result   No active disease is seen.               This report was signed and finalized on 8/24/2024 2:49 PM by Dr. Godwin Santa MD.              ED Course  ED Course as of 08/25/24 0108   Sat Aug 24, 2024   1626 Pt states that he has been out of klonipin for the past 4 days. He states that he is scheduled to get them refilled next week  [CW]   1755 Patient had 2 negative troponins.  CMP shows a glucose of 119, BUN 10, creatinine 0.71, sodium 142, potassium 4.1 UDS positive for benzodiazepines and Suboxone.  Dimer elevated 0.95 CBC white count is 4.04 hemoglobin 14.1 hematocrit 39.9 platelets are 121 which seems to be about baseline for the patient.  Chest x-ray was unremarkable.  CTA of the chest was negative for any pulmonary embolus.  Patient EKG is sinus bradycardia.  Patient states he is feeling better since he has been in the emergency department.  Advised the patient most likely he needs a Holter  monitor to wear to  any arrhythmias that he may be having.  His chief complaint today was palpitations and dizziness.  He does have a primary care provider and will have him follow-up with his primary care provider on Monday.  Will put a order in for a Holter monitor.  Patient is in agreement with the care plan and voices understanding of instructions.  He will be discharged home shortly in stable condition.  Advised to return the emergency department he has chest pain, shortness of breath, any of his symptoms worsen. [CW]      ED Course User Index  [CW] Babs Perrin APRN        Medical Decision Making  Patient is a 44-year-old male presents the emergency department with palpitations, nausea, panicky feeling that started about 1230 yesterday.  He states that it got a little better throughout the night and then when he woke up again this morning he started having the same symptoms.  He states he felt like his heart was racing.  As of note he has been out of his Klonopin for the past 4 days that he takes for anxiety.  He denies any chest pain or shortness of breath.  He denies any cough or congestion.  No fever or chills.  Patient has a history of anxiety, asthma, PTSD, seizures, TBI.  He denies any illicit drug use.  He states he is prescribed Suboxone for opioid addiction.  Course of treatment in the er: non toxic appearing. No acute distress. Mildly anxious appearing.  Vitals blood pressure 142/100, temp 98, heart rate 78, respirations 20, O2 sats 96% on room air.  PERRLA extract movements are intact.  Lungs clear to auscultation.  CV normal sinus rhythm.  Abdomen is soft, nondistended nontender.  Neuro logically is intact.  There is no focal weakness.  Moves all extremities well. Cardiac workup, cxr, tsh, cxr uds have been ordered  Labs Reviewed  COMPREHENSIVE METABOLIC PANEL - Abnormal; Notable for the following components:     Glucose                       119 (*)                Creatinine                     0.71 (*)               ALT (SGPT)                    46 (*)              All other components within normal limits         Narrative: GFR Normal >60                  Chronic Kidney Disease <60                  Kidney Failure <15                    URINE DRUG SCREEN - Abnormal; Notable for the following components:     Benzodiazepine Screen, Urine   Positive (*)               Buprenorphine, Screen, Urine   Positive (*)            All other components within normal limits         Narrative: Cutoff For Drugs Screened:                                    Amphetamines               500 ng/ml                  Barbiturates               200 ng/ml                  Benzodiazepines            150 ng/ml                  Cocaine                    150 ng/ml                  Methadone                  200 ng/ml                  Opiates                    100 ng/ml                  Phencyclidine               25 ng/ml                  THC                         50 ng/ml                  Methamphetamine            500 ng/ml                  Tricyclic Antidepressants  300 ng/ml                  Oxycodone                  100 ng/ml                  Buprenorphine               10 ng/ml                                    The normal value for all drugs tested is negative. This report includes unconfirmed screening results, with the cutoff values listed, to be used for medical treatment purposes only.  Unconfirmed results must not be used for non-medical purposes such as employment or legal testing.  Clinical consideration should be applied to any drug of abuse test, particularly when unconfirmed results are used.    D-DIMER, QUANTITATIVE - Abnormal; Notable for the following components:     D-Dimer, Quantitative         0.95 (*)            All other components within normal limits         Narrative: According to the assay 's published package insert, a normal (<0.50 MCGFEU/mL) D-dimer result in conjunction with  "a non-high clinical probability assessment, excludes deep vein thrombosis (DVT) and pulmonary embolism (PE) with high sensitivity.                                    D-dimer values increase with age and this can make VTE exclusion of an older population difficult. To address this, the [employer], based on best available evidence and recent guidelines, recommends that clinicians use age-adjusted D-dimer thresholds in patients greater than 50 years of age with: a) a low probability of PE who do not meet all Pulmonary Embolism Rule Out Criteria, or b) in those with intermediate probability of PE.                   The formula for an age-adjusted D-dimer cut-off is \"age/100\".                  For example, a 60 year old patient would have an age-adjusted cut-off of 0.60 MCGFEU/mL and an 80 year old 0.80 MCGFEU/mL.  CBC WITH AUTO DIFFERENTIAL - Abnormal; Notable for the following components:     RDW-SD                        35.0 (*)               MPV                           12.7 (*)               Platelets                     121 (*)             All other components within normal limits  PROTIME-INR - Normal  TSH - Normal  SINGLE HS TROPONIN T - Normal         Narrative: High Sensitive Troponin T Reference Range:                  <14.0 ng/L- Negative Female for AMI                  <22.0 ng/L- Negative Male for AMI                  >=14 - Abnormal Female indicating possible myocardial injury.                  >=22 - Abnormal Male indicating possible myocardial injury.                   Clinicians would have to utilize clinical acumen, EKG, Troponin, and serial changes to determine if it is an Acute Myocardial Infarction or myocardial injury due to an underlying chronic condition.                                       FENTANYL, URINE - Normal         Narrative: Negative Threshold:                                      Fentanyl 5 ng/mL                                     The normal value for the drug " tested is negative. This report includes final unconfirmed screening results to be used for medical treatment purposes only. Unconfirmed results must not be used for non-medical purposes such as employment or legal testing. Clinical consideration should be applied to any drug of abuse test, particularly when unconfirmed results are used.         SINGLE HS TROPONIN T - Normal         Narrative: High Sensitive Troponin T Reference Range:                  <14.0 ng/L- Negative Female for AMI                  <22.0 ng/L- Negative Male for AMI                  >=14 - Abnormal Female indicating possible myocardial injury.                  >=22 - Abnormal Male indicating possible myocardial injury.                   Clinicians would have to utilize clinical acumen, EKG, Troponin, and serial changes to determine if it is an Acute Myocardial Infarction or myocardial injury due to an underlying chronic condition.                                       CBC AND DIFFERENTIAL  CT Angiogram Chest   Final Result     XR Chest 1 View   Final Result    No active disease is seen.                   This report was signed and finalized on 8/24/2024 2:49 PM by Dr. Godwin Santa MD.        Patient had 2 negative troponins.  CMP shows a glucose of 119, BUN 10, creatinine 0.71, sodium 142, potassium 4.1 UDS positive for benzodiazepines and Suboxone.  Dimer elevated 0.95 CBC white count is 4.04 hemoglobin 14.1 hematocrit 39.9 platelets are 121 which seems to be about baseline for the patient.  Chest x-ray was unremarkable.  CTA of the chest was negative for any pulmonary embolus.  Patient EKG is sinus bradycardia.  Patient states he is feeling better since he has been in the emergency department.  Advised the patient most likely he needs a Holter monitor to wear to  any arrhythmias that he may be having.  His chief complaint today was palpitations and dizziness.  He does have a primary care provider and will have him follow-up with  his primary care provider on Monday.  Will put a order in for a Holter monitor.  Patient is in agreement with the care plan and voices understanding of instructions.  He will be discharged home shortly in stable condition.  Advised to return the emergency department he has chest pain, shortness of breath, any of his symptoms worsen.      Differential diagnosis to include but not limited to: anxiety; pulmonary embolus; substance abuse; acs; pneumonia    Problems Addressed:  Palpitations: complicated acute illness or injury    Amount and/or Complexity of Data Reviewed  Labs: ordered. Decision-making details documented in ED Course.  Radiology: ordered. Decision-making details documented in ED Course.  ECG/medicine tests: ordered. Decision-making details documented in ED Course.    Risk  Prescription drug management.         Final diagnoses:   Palpitations          Babs Perrin, APRN  08/25/24 0108

## 2024-08-29 LAB
QT INTERVAL: 394 MS
QT INTERVAL: 408 MS
QTC INTERVAL: 393 MS
QTC INTERVAL: 418 MS

## 2024-08-30 ENCOUNTER — HOSPITAL ENCOUNTER (OUTPATIENT)
Dept: CARDIOLOGY | Facility: HOSPITAL | Age: 44
Discharge: HOME | End: 2024-08-30
Payer: COMMERCIAL

## 2024-08-30 DIAGNOSIS — R00.2 PALPITATIONS: ICD-10-CM

## 2024-08-30 PROCEDURE — 93246 EXT ECG>7D<15D RECORDING: CPT

## 2024-09-23 ENCOUNTER — APPOINTMENT (OUTPATIENT)
Dept: GENERAL RADIOLOGY | Facility: HOSPITAL | Age: 44
End: 2024-09-23
Payer: COMMERCIAL

## 2024-09-23 PROCEDURE — 74018 RADEX ABDOMEN 1 VIEW: CPT

## 2025-03-17 ENCOUNTER — TRANSCRIBE ORDERS (OUTPATIENT)
Dept: ADMINISTRATIVE | Facility: HOSPITAL | Age: 45
End: 2025-03-17
Payer: COMMERCIAL

## 2025-03-17 DIAGNOSIS — N20.0 CALCULUS OF KIDNEY: Primary | ICD-10-CM

## 2025-03-25 ENCOUNTER — TELEPHONE (OUTPATIENT)
Dept: GASTROENTEROLOGY | Facility: CLINIC | Age: 45
End: 2025-03-25
Payer: COMMERCIAL

## 2025-03-25 NOTE — TELEPHONE ENCOUNTER
Called Shaheen Lee  to reschedule missed appointment. No answer, left message to call to reschedule at [phone]

## 2025-03-31 ENCOUNTER — HOSPITAL ENCOUNTER (OUTPATIENT)
Dept: CT IMAGING | Facility: HOSPITAL | Age: 45
Discharge: HOME | End: 2025-03-31
Payer: COMMERCIAL

## 2025-05-08 ENCOUNTER — NURSE TRIAGE (OUTPATIENT)
Dept: CALL CENTER | Facility: HOSPITAL | Age: 45
End: 2025-05-08
Payer: COMMERCIAL

## 2025-05-08 NOTE — TELEPHONE ENCOUNTER
"No known heart problems    Pain left chest at nipple line, rib cage lower 2 ribs. Pain lasted maybe 3 seconds    Had a heart moniter for heart racing last summer. He wore it for 3 days, he sweated it off.  Never heard form that        Reason for Disposition   Chest pain(s) lasting a few seconds    Additional Information   Negative: SEVERE difficulty breathing (e.g., struggling for each breath, speaks in single words)   Negative: Difficult to awaken or acting confused (e.g., disoriented, slurred speech)   Negative: Shock suspected (e.g., cold/pale/clammy skin, too weak to stand, low BP, rapid pulse)   Negative: Passed out (i.e., lost consciousness, collapsed and was not responding)   Negative: [1] Chest pain lasts > 5 minutes AND [2] age > 44   Negative: [1] Chest pain lasts > 5 minutes AND [2] age > 30 AND [3] one or more cardiac risk factors (e.g., diabetes, high blood pressure, high cholesterol, smoker, or strong family history of heart disease)   Negative: [1] Chest pain lasts > 5 minutes AND [2] history of heart disease (i.e., angina, heart attack, heart failure, bypass surgery, takes nitroglycerin)   Negative: [1] Chest pain lasts > 5 minutes AND [2] described as crushing, pressure-like, or heavy   Negative: Heart beating < 50 beats per minute OR > 140 beats per minute   Negative: Visible sweat on face or sweat dripping down face   Negative: Sounds like a life-threatening emergency to the triager   Negative: Followed a chest injury   Negative: SEVERE chest pain   Negative: [1] Chest pain (or \"angina\") comes and goes AND [2] is happening more often (increasing in frequency) or getting worse (increasing in severity)  (Exception: Chest pains that last only a few seconds.)   Negative: Pain also in shoulder(s) or arm(s) or jaw  (Exception: Pain is clearly made worse by movement.)   Negative: Difficulty breathing   Negative: Dizziness or lightheadedness   Negative: Coughing up blood   Negative: Cocaine use within " "last 3 days   Negative: Major surgery in past month   Negative: Hip or leg fracture (broken bone) in past month (or had cast on leg or ankle in past month)   Negative: Illness requiring prolonged bedrest in past month (e.g., immobilization, long hospital stay)   Negative: Long-distance travel in past month (e.g., car, bus, train, plane; with trip lasting 6 or more hours)   Negative: History of prior \"blood clot\" in leg or lungs (i.e., deep vein thrombosis, pulmonary embolism)   Negative: History of inherited increased risk of blood clots (e.g., Factor 5 Leiden, Anti-thrombin 3, Protein C or Protein S deficiency, Prothrombin mutation)   Negative: Cancer treatment in past six months (or has cancer now)   Negative: [1] Chest pain lasts > 5 minutes AND [2] occurred in past 3 days (72 hours) (Exception: Feels exactly the same as previously diagnosed heartburn and has accompanying sour taste in mouth.)   Negative: Taking a deep breath makes pain worse   Negative: Patient sounds very sick or weak to the triager   Negative: [1] Chest pain lasts > 5 minutes AND [2] occurred > 3 days ago (72 hours) AND [3] NO chest pain or cardiac symptoms now   Negative: [1] Chest pain lasts < 5 minutes AND [2] NO chest pain or cardiac symptoms (e.g., breathing difficulty, sweating) now  (Exception: Chest pains that last only a few seconds.)   Negative: Fever > 100.4 F (38.0 C)   Negative: Rash in same area as pain (may be described as \"small blisters\")   Negative: [1] Patient says chest pain feels exactly the same as previously diagnosed \"heartburn\" AND [2] describes burning in chest AND [3] accompanying sour taste in mouth   Negative: [1] Chest pain lasting < 5 minutes AND [2] has not taken prescribed nitroglycerin   Negative: [1] Chest pain lasting < 5 minutes AND [2] completely gone after taking nitroglycerin   Negative: [1] Chest pain from known angina comes and goes AND [2] is NOT happening more often (increasing in frequency) or " "getting worse (increasing in severity)   Negative: [1] Chest pain(s) lasting a few seconds from coughing AND [2] persists > 3 days   Negative: [1] Chest pain(s) lasting a few seconds AND [2] persists > 3 days   Negative: Chest pain(s) lasting a few seconds from coughing    Answer Assessment - Initial Assessment Questions  1. LOCATION: \"Where does it hurt?\"        Under Nipple line     left rib  2. RADIATION: \"Does the pain go anywhere else?\" (e.g., into neck, jaw, arms, back)      Pain went across 3-4 inches  3. ONSET: \"When did the chest pain begin?\" (Minutes, hours or days)      Lasted 3 seconds while connecting battery cable  4. PATTERN: \"Does the pain come and go, or has it been constant since it started?\"  \"Does it get worse with exertion?\"   Pain occurred while bending over connecting battery cables  5. DURATION: \"How long does it last\" (e.g., seconds, minutes, hours)            30 seconds  6. SEVERITY: \"How bad is the pain?\"  (e.g., Scale 1-10; mild, moderate, or severe)     - MILD (1-3): doesn't interfere with normal activities      - MODERATE (4-7): interferes with normal activities or awakens from sleep     - SEVERE (8-10): excruciating pain, unable to do any normal activities         7/10  7. CARDIAC RISK FACTORS: \"Do you have any history of heart problems or risk factors for heart disease?\" (e.g., angina, prior heart attack; diabetes, high blood pressure, high cholesterol, smoker, or strong family history of heart disease)           Borderline HTN     8. PULMONARY RISK FACTORS: \"Do you have any history of lung disease?\"  (e.g., blood clots in lung, asthma, emphysema, birth control pills)       denies  9. CAUSE: \"What do you think is causing the chest pain?\"          Not sure  10. OTHER SYMPTOMS: \"Do you have any other symptoms?\" (e.g., dizziness, nausea, vomiting, sweating, fever, difficulty breathing, cough)        Denies other symptoms  11. PREGNANCY: \"Is there any chance you are pregnant?\" \"When was " "your last menstrual period?\"    na    Protocols used: Chest Pain-ADULT-AH    "

## 2025-06-16 ENCOUNTER — TELEPHONE (OUTPATIENT)
Dept: NEUROLOGY | Facility: CLINIC | Age: 45
End: 2025-06-16
Payer: COMMERCIAL

## 2025-06-16 NOTE — TELEPHONE ENCOUNTER
CALLED PT TO GET HIS APPOINTMENT FOR 7/3/25 RESCHEDULED.  LEFT VOICE MAIL TO CALL THE OFFICE BACK.  HUB MAY RELAY AND RESCHEDULE.

## 2025-06-18 ENCOUNTER — TELEPHONE (OUTPATIENT)
Dept: NEUROLOGY | Facility: CLINIC | Age: 45
End: 2025-06-18
Payer: COMMERCIAL

## 2025-06-18 NOTE — TELEPHONE ENCOUNTER
Called pt to get his appointment on 7/3/2025 rescheduled left a message to please call our office back to reschedule this appointment HUB may relay and reschedule

## 2025-06-19 ENCOUNTER — TELEPHONE (OUTPATIENT)
Dept: NEUROLOGY | Facility: CLINIC | Age: 45
End: 2025-06-19
Payer: COMMERCIAL

## 2025-06-19 NOTE — TELEPHONE ENCOUNTER
I called patient to reschedule his 7/3/2025 appt with Natalio, no answer, left detailed voice message to call the office back if he would like an appt.    Patient has been called 2 times before with no response.     OK for hub to relay message  OK for hub to schedule appt.    CC

## 2025-07-30 ENCOUNTER — TELEPHONE (OUTPATIENT)
Dept: NEUROLOGY | Facility: CLINIC | Age: 45
End: 2025-07-30
Payer: COMMERCIAL

## 2025-08-04 DIAGNOSIS — G40.309 GENERALIZED SEIZURE DISORDER: ICD-10-CM

## 2025-08-05 RX ORDER — LEVETIRACETAM 500 MG/1
1500 TABLET, FILM COATED, EXTENDED RELEASE ORAL 2 TIMES DAILY
Qty: 180 TABLET | Refills: 0 | Status: SHIPPED | OUTPATIENT
Start: 2025-08-05

## (undated) DEVICE — CUFF,BP,DISP,1 TUBE,ADULT,HP: Brand: MEDLINE

## (undated) DEVICE — THE CHANNEL CLEANING BRUSH IS A NYLON FLEXI BRUSH ATTACHED TO A FLEXIBLE PLASTIC SHEATH DESIGNED TO SAFELY REMOVE DEBRIS FROM FLEXIBLE ENDOSCOPES.

## (undated) DEVICE — CONMED SCOPE SAVER BITE BLOCK, 20X27 MM: Brand: SCOPE SAVER

## (undated) DEVICE — YANKAUER,BULB TIP WITH VENT: Brand: ARGYLE

## (undated) DEVICE — SENSR O2 OXIMAX FNGR A/ 18IN NONSTR

## (undated) DEVICE — TBG SMPL FLTR LINE NASL 02/C02 A/ BX/100

## (undated) DEVICE — Device: Brand: DEFENDO AIR/WATER/SUCTION AND BIOPSY VALVE